# Patient Record
Sex: MALE | Race: WHITE | NOT HISPANIC OR LATINO | Employment: UNEMPLOYED | URBAN - METROPOLITAN AREA
[De-identification: names, ages, dates, MRNs, and addresses within clinical notes are randomized per-mention and may not be internally consistent; named-entity substitution may affect disease eponyms.]

---

## 2020-01-01 ENCOUNTER — TELEPHONE (OUTPATIENT)
Dept: CARDIOLOGY | Facility: CLINIC | Age: 50
End: 2020-01-01

## 2020-01-01 ENCOUNTER — HOSPITAL ENCOUNTER (OUTPATIENT)
Dept: RADIOLOGY | Facility: HOSPITAL | Age: 50
Discharge: HOME | End: 2020-12-01
Attending: PHYSICIAN ASSISTANT
Payer: MEDICARE

## 2020-01-01 ENCOUNTER — PATIENT MESSAGE (OUTPATIENT)
Dept: CARDIOLOGY | Facility: CLINIC | Age: 50
End: 2020-01-01

## 2020-01-01 ENCOUNTER — OFFICE VISIT (OUTPATIENT)
Dept: INTERNAL MEDICINE | Facility: CLINIC | Age: 50
End: 2020-01-01
Payer: MEDICARE

## 2020-01-01 ENCOUNTER — LAB VISIT (OUTPATIENT)
Dept: LAB | Facility: HOSPITAL | Age: 50
End: 2020-01-01
Attending: INTERNAL MEDICINE
Payer: MEDICARE

## 2020-01-01 ENCOUNTER — TELEPHONE (OUTPATIENT)
Dept: DIABETES | Facility: CLINIC | Age: 50
End: 2020-01-01

## 2020-01-01 ENCOUNTER — HOSPITAL ENCOUNTER (EMERGENCY)
Facility: HOSPITAL | Age: 50
Discharge: HOME | End: 2020-09-15
Attending: EMERGENCY MEDICINE
Payer: MEDICARE

## 2020-01-01 ENCOUNTER — TELEPHONE (OUTPATIENT)
Dept: INTERNAL MEDICINE | Facility: CLINIC | Age: 50
End: 2020-01-01

## 2020-01-01 ENCOUNTER — HOSPITAL ENCOUNTER (OUTPATIENT)
Facility: HOSPITAL | Age: 50
Discharge: HOME | End: 2020-12-17
Attending: EMERGENCY MEDICINE | Admitting: INTERNAL MEDICINE
Payer: MEDICARE

## 2020-01-01 ENCOUNTER — OFFICE VISIT (OUTPATIENT)
Dept: CARDIOLOGY | Facility: CLINIC | Age: 50
End: 2020-01-01
Payer: MEDICARE

## 2020-01-01 VITALS
HEART RATE: 91 BPM | OXYGEN SATURATION: 99 % | DIASTOLIC BLOOD PRESSURE: 90 MMHG | BODY MASS INDEX: 45.04 KG/M2 | WEIGHT: 314.63 LBS | HEIGHT: 70 IN | RESPIRATION RATE: 20 BRPM | SYSTOLIC BLOOD PRESSURE: 140 MMHG | TEMPERATURE: 98 F

## 2020-01-01 VITALS
HEART RATE: 110 BPM | WEIGHT: 302 LBS | SYSTOLIC BLOOD PRESSURE: 122 MMHG | TEMPERATURE: 101 F | HEIGHT: 70 IN | BODY MASS INDEX: 43.23 KG/M2 | DIASTOLIC BLOOD PRESSURE: 84 MMHG | OXYGEN SATURATION: 98 %

## 2020-01-01 VITALS
DIASTOLIC BLOOD PRESSURE: 80 MMHG | HEIGHT: 70 IN | OXYGEN SATURATION: 97 % | TEMPERATURE: 99 F | BODY MASS INDEX: 44.57 KG/M2 | HEART RATE: 82 BPM | WEIGHT: 311.31 LBS | SYSTOLIC BLOOD PRESSURE: 122 MMHG

## 2020-01-01 VITALS
DIASTOLIC BLOOD PRESSURE: 78 MMHG | WEIGHT: 311.31 LBS | OXYGEN SATURATION: 98 % | BODY MASS INDEX: 44.67 KG/M2 | HEART RATE: 110 BPM | SYSTOLIC BLOOD PRESSURE: 160 MMHG

## 2020-01-01 VITALS
TEMPERATURE: 97 F | HEIGHT: 70 IN | OXYGEN SATURATION: 98 % | SYSTOLIC BLOOD PRESSURE: 139 MMHG | WEIGHT: 311.94 LBS | BODY MASS INDEX: 44.66 KG/M2 | DIASTOLIC BLOOD PRESSURE: 80 MMHG | HEART RATE: 97 BPM | RESPIRATION RATE: 17 BRPM

## 2020-01-01 DIAGNOSIS — E11.69 HYPERLIPIDEMIA ASSOCIATED WITH TYPE 2 DIABETES MELLITUS: Chronic | ICD-10-CM

## 2020-01-01 DIAGNOSIS — E78.2 MIXED HYPERLIPIDEMIA: Primary | ICD-10-CM

## 2020-01-01 DIAGNOSIS — I10 ESSENTIAL HYPERTENSION: ICD-10-CM

## 2020-01-01 DIAGNOSIS — I25.2 HISTORY OF MI (MYOCARDIAL INFARCTION): ICD-10-CM

## 2020-01-01 DIAGNOSIS — R07.9 CHEST PAIN: Primary | ICD-10-CM

## 2020-01-01 DIAGNOSIS — Z79.4 TYPE 2 DIABETES MELLITUS WITH HYPERGLYCEMIA, WITH LONG-TERM CURRENT USE OF INSULIN: Primary | ICD-10-CM

## 2020-01-01 DIAGNOSIS — R07.9 CHEST PAIN ON EXERTION: ICD-10-CM

## 2020-01-01 DIAGNOSIS — Z09 HOSPITAL DISCHARGE FOLLOW-UP: Primary | ICD-10-CM

## 2020-01-01 DIAGNOSIS — E11.65 TYPE 2 DIABETES MELLITUS WITH HYPERGLYCEMIA, WITH LONG-TERM CURRENT USE OF INSULIN: ICD-10-CM

## 2020-01-01 DIAGNOSIS — Z79.4 TYPE 2 DIABETES MELLITUS WITH HYPERGLYCEMIA, WITH LONG-TERM CURRENT USE OF INSULIN: ICD-10-CM

## 2020-01-01 DIAGNOSIS — I25.2 HISTORY OF MI (MYOCARDIAL INFARCTION): Chronic | ICD-10-CM

## 2020-01-01 DIAGNOSIS — E66.01 MORBID OBESITY WITH BODY MASS INDEX OF 40.0-44.9 IN ADULT: Chronic | ICD-10-CM

## 2020-01-01 DIAGNOSIS — Z79.4 TYPE 2 DIABETES MELLITUS WITH OTHER SPECIFIED COMPLICATION, WITH LONG-TERM CURRENT USE OF INSULIN: ICD-10-CM

## 2020-01-01 DIAGNOSIS — E11.69 TYPE 2 DIABETES MELLITUS WITH OTHER SPECIFIED COMPLICATION, WITH LONG-TERM CURRENT USE OF INSULIN: ICD-10-CM

## 2020-01-01 DIAGNOSIS — I15.2 HYPERTENSION ASSOCIATED WITH DIABETES: Chronic | ICD-10-CM

## 2020-01-01 DIAGNOSIS — G47.33 OSA ON CPAP: ICD-10-CM

## 2020-01-01 DIAGNOSIS — Z23 NEED FOR PNEUMOCOCCAL VACCINATION: ICD-10-CM

## 2020-01-01 DIAGNOSIS — E11.65 TYPE 2 DIABETES MELLITUS WITH HYPERGLYCEMIA, WITH LONG-TERM CURRENT USE OF INSULIN: Primary | ICD-10-CM

## 2020-01-01 DIAGNOSIS — E11.59 HYPERTENSION ASSOCIATED WITH DIABETES: Chronic | ICD-10-CM

## 2020-01-01 DIAGNOSIS — R07.9 CHEST PAIN, UNSPECIFIED TYPE: Primary | ICD-10-CM

## 2020-01-01 DIAGNOSIS — E78.5 HYPERLIPIDEMIA ASSOCIATED WITH TYPE 2 DIABETES MELLITUS: Chronic | ICD-10-CM

## 2020-01-01 DIAGNOSIS — R07.9 CHEST PAIN, UNSPECIFIED TYPE: ICD-10-CM

## 2020-01-01 DIAGNOSIS — Z23 NEED FOR INFLUENZA VACCINATION: ICD-10-CM

## 2020-01-01 DIAGNOSIS — E78.2 MIXED HYPERLIPIDEMIA: ICD-10-CM

## 2020-01-01 DIAGNOSIS — R50.9 FEVER, UNSPECIFIED FEVER CAUSE: Primary | ICD-10-CM

## 2020-01-01 DIAGNOSIS — I25.110 CORONARY ARTERY DISEASE WITH UNSTABLE ANGINA PECTORIS, UNSPECIFIED VESSEL OR LESION TYPE, UNSPECIFIED WHETHER NATIVE OR TRANSPLANTED HEART: ICD-10-CM

## 2020-01-01 DIAGNOSIS — R07.9 CHEST PAIN: ICD-10-CM

## 2020-01-01 DIAGNOSIS — E66.01 MORBIDLY OBESE: ICD-10-CM

## 2020-01-01 DIAGNOSIS — R06.89 ABNORMAL BREATH SOUNDS: ICD-10-CM

## 2020-01-01 DIAGNOSIS — I25.118 CORONARY ARTERY DISEASE OF NATIVE ARTERY OF NATIVE HEART WITH STABLE ANGINA PECTORIS: ICD-10-CM

## 2020-01-01 LAB
ALBUMIN SERPL BCP-MCNC: 3.9 G/DL (ref 3.5–5.2)
ALBUMIN SERPL BCP-MCNC: 4 G/DL (ref 3.5–5.2)
ALP SERPL-CCNC: 73 U/L (ref 55–135)
ALP SERPL-CCNC: 74 U/L (ref 55–135)
ALT SERPL W/O P-5'-P-CCNC: 27 U/L (ref 10–44)
ALT SERPL W/O P-5'-P-CCNC: 37 U/L (ref 10–44)
ANION GAP SERPL CALC-SCNC: 11 MMOL/L (ref 8–16)
ANION GAP SERPL CALC-SCNC: 8 MMOL/L (ref 8–16)
ANION GAP SERPL CALC-SCNC: 9 MMOL/L (ref 8–16)
AORTIC ROOT ANNULUS: 3.9 CM
APTT BLDCRRT: 26.1 SEC (ref 21–32)
ASCENDING AORTA: 3.5 CM
AST SERPL-CCNC: 12 U/L (ref 10–40)
AST SERPL-CCNC: 19 U/L (ref 10–40)
AV INDEX (PROSTH): 0.87
AV MEAN GRADIENT: 4 MMHG
AV PEAK GRADIENT: 6 MMHG
AV VALVE AREA: 3.49 CM2
AV VELOCITY RATIO: 0.75
BACTERIA #/AREA URNS HPF: NORMAL /HPF
BASOPHILS # BLD AUTO: 0.01 K/UL (ref 0–0.2)
BASOPHILS # BLD AUTO: 0.03 K/UL (ref 0–0.2)
BASOPHILS # BLD AUTO: 0.04 K/UL (ref 0–0.2)
BASOPHILS NFR BLD: 0.2 % (ref 0–1.9)
BASOPHILS NFR BLD: 0.4 % (ref 0–1.9)
BASOPHILS NFR BLD: 0.5 % (ref 0–1.9)
BILIRUB SERPL-MCNC: 0.3 MG/DL (ref 0.1–1)
BILIRUB SERPL-MCNC: 0.5 MG/DL (ref 0.1–1)
BILIRUB UR QL STRIP: NEGATIVE
BNP SERPL-MCNC: <10 PG/ML (ref 0–99)
BNP SERPL-MCNC: <10 PG/ML (ref 0–99)
BSA FOR ECHO PROCEDURE: 2.64 M2
BUN SERPL-MCNC: 12 MG/DL (ref 6–20)
BUN SERPL-MCNC: 15 MG/DL (ref 6–20)
BUN SERPL-MCNC: 18 MG/DL (ref 6–20)
BUN SERPL-MCNC: 21 MG/DL (ref 6–20)
BUN SERPL-MCNC: 26 MG/DL (ref 6–20)
CALCIUM SERPL-MCNC: 9.4 MG/DL (ref 8.7–10.5)
CALCIUM SERPL-MCNC: 9.4 MG/DL (ref 8.7–10.5)
CALCIUM SERPL-MCNC: 9.5 MG/DL (ref 8.7–10.5)
CALCIUM SERPL-MCNC: 9.5 MG/DL (ref 8.7–10.5)
CALCIUM SERPL-MCNC: 9.8 MG/DL (ref 8.7–10.5)
CHLORIDE SERPL-SCNC: 101 MMOL/L (ref 95–110)
CHLORIDE SERPL-SCNC: 102 MMOL/L (ref 95–110)
CHLORIDE SERPL-SCNC: 99 MMOL/L (ref 95–110)
CK SERPL-CCNC: 42 U/L (ref 20–200)
CLARITY UR: CLEAR
CO2 SERPL-SCNC: 25 MMOL/L (ref 23–29)
CO2 SERPL-SCNC: 27 MMOL/L (ref 23–29)
CO2 SERPL-SCNC: 27 MMOL/L (ref 23–29)
CO2 SERPL-SCNC: 29 MMOL/L (ref 23–29)
CO2 SERPL-SCNC: 32 MMOL/L (ref 23–29)
COLOR UR: YELLOW
CREAT SERPL-MCNC: 1.2 MG/DL (ref 0.5–1.4)
CREAT SERPL-MCNC: 1.3 MG/DL (ref 0.5–1.4)
CV ECHO LV RWT: 0.91 CM
CV STRESS BASE HR: 93 BPM
DIASTOLIC BLOOD PRESSURE: 81 MMHG
DIFFERENTIAL METHOD: ABNORMAL
DOP CALC AO PEAK VEL: 1.22 M/S
DOP CALC AO VTI: 18.88 CM
DOP CALC LVOT AREA: 4 CM2
DOP CALC LVOT DIAMETER: 2.26 CM
DOP CALC LVOT PEAK VEL: 0.92 M/S
DOP CALC LVOT STROKE VOLUME: 65.92 CM3
DOP CALC RVOT PEAK VEL: 0.76 M/S
DOP CALC RVOT VTI: 14.03 CM
DOP CALCLVOT PEAK VEL VTI: 16.44 CM
E WAVE DECELERATION TIME: 196.61 MSEC
E/A RATIO: 1.04
E/E' RATIO: 10 M/S
ECHO LV POSTERIOR WALL: 1.71 CM (ref 0.6–1.1)
EJECTION FRACTION- HIGH: 65 %
END DIASTOLIC INDEX-HIGH: 158 ML/M2
END DIASTOLIC INDEX-LOW: 94 ML/M2
END SYSTOLIC INDEX-HIGH: 71 ML/M2
END SYSTOLIC INDEX-LOW: 33 ML/M2
EOSINOPHIL # BLD AUTO: 0.1 K/UL (ref 0–0.5)
EOSINOPHIL # BLD AUTO: 0.2 K/UL (ref 0–0.5)
EOSINOPHIL # BLD AUTO: 0.3 K/UL (ref 0–0.5)
EOSINOPHIL NFR BLD: 2 % (ref 0–8)
EOSINOPHIL NFR BLD: 2.6 % (ref 0–8)
EOSINOPHIL NFR BLD: 3.2 % (ref 0–8)
EOSINOPHIL NFR BLD: 3.9 % (ref 0–8)
EOSINOPHIL NFR BLD: 4.1 % (ref 0–8)
ERYTHROCYTE [DISTWIDTH] IN BLOOD BY AUTOMATED COUNT: 12.1 % (ref 11.5–14.5)
ERYTHROCYTE [DISTWIDTH] IN BLOOD BY AUTOMATED COUNT: 12.3 % (ref 11.5–14.5)
ERYTHROCYTE [DISTWIDTH] IN BLOOD BY AUTOMATED COUNT: 12.3 % (ref 11.5–14.5)
ERYTHROCYTE [DISTWIDTH] IN BLOOD BY AUTOMATED COUNT: 12.4 % (ref 11.5–14.5)
ERYTHROCYTE [DISTWIDTH] IN BLOOD BY AUTOMATED COUNT: 12.4 % (ref 11.5–14.5)
EST. GFR  (AFRICAN AMERICAN): >60 ML/MIN/1.73 M^2
EST. GFR  (NON AFRICAN AMERICAN): >60 ML/MIN/1.73 M^2
ESTIMATED AVG GLUCOSE: 249 MG/DL (ref 68–131)
FRACTIONAL SHORTENING: 36 % (ref 28–44)
GLUCOSE SERPL-MCNC: 172 MG/DL (ref 70–110)
GLUCOSE SERPL-MCNC: 174 MG/DL (ref 70–110)
GLUCOSE SERPL-MCNC: 215 MG/DL (ref 70–110)
GLUCOSE SERPL-MCNC: 237 MG/DL (ref 70–110)
GLUCOSE SERPL-MCNC: 368 MG/DL (ref 70–110)
GLUCOSE UR QL STRIP: ABNORMAL
HBA1C MFR BLD HPLC: 10.3 % (ref 4–5.6)
HCT VFR BLD AUTO: 41.6 % (ref 40–54)
HCT VFR BLD AUTO: 43.6 % (ref 40–54)
HCT VFR BLD AUTO: 44.1 % (ref 40–54)
HCT VFR BLD AUTO: 45 % (ref 40–54)
HCT VFR BLD AUTO: 45.6 % (ref 40–54)
HCV AB SERPL QL IA: NEGATIVE
HGB BLD-MCNC: 13.2 G/DL (ref 14–18)
HGB BLD-MCNC: 13.6 G/DL (ref 14–18)
HGB BLD-MCNC: 13.9 G/DL (ref 14–18)
HGB BLD-MCNC: 14.1 G/DL (ref 14–18)
HGB BLD-MCNC: 14.3 G/DL (ref 14–18)
HGB UR QL STRIP: NEGATIVE
HIV 1+2 AB+HIV1 P24 AG SERPL QL IA: NEGATIVE
IMM GRANULOCYTES # BLD AUTO: 0.01 K/UL (ref 0–0.04)
IMM GRANULOCYTES # BLD AUTO: 0.02 K/UL (ref 0–0.04)
IMM GRANULOCYTES # BLD AUTO: 0.02 K/UL (ref 0–0.04)
IMM GRANULOCYTES # BLD AUTO: 0.03 K/UL (ref 0–0.04)
IMM GRANULOCYTES # BLD AUTO: 0.03 K/UL (ref 0–0.04)
IMM GRANULOCYTES NFR BLD AUTO: 0.2 % (ref 0–0.5)
IMM GRANULOCYTES NFR BLD AUTO: 0.3 % (ref 0–0.5)
IMM GRANULOCYTES NFR BLD AUTO: 0.4 % (ref 0–0.5)
INR PPP: 1 (ref 0.8–1.2)
INTERVENTRICULAR SEPTUM: 1.75 CM (ref 0.6–1.1)
IVRT: 82.78 MSEC
KETONES UR QL STRIP: ABNORMAL
LA MAJOR: 5.15 CM
LA MINOR: 3.61 CM
LEFT ATRIUM SIZE: 3.71 CM
LEFT INTERNAL DIMENSION IN SYSTOLE: 2.42 CM (ref 2.1–4)
LEFT VENTRICLE DIASTOLIC VOLUME INDEX: 24.25 ML/M2
LEFT VENTRICLE DIASTOLIC VOLUME: 60.97 ML
LEFT VENTRICLE MASS INDEX: 108 G/M2
LEFT VENTRICLE SYSTOLIC VOLUME INDEX: 8.1 ML/M2
LEFT VENTRICLE SYSTOLIC VOLUME: 20.49 ML
LEFT VENTRICULAR INTERNAL DIMENSION IN DIASTOLE: 3.77 CM (ref 3.5–6)
LEFT VENTRICULAR MASS: 270.46 G
LEUKOCYTE ESTERASE UR QL STRIP: NEGATIVE
LV LATERAL E/E' RATIO: 8.75 M/S
LV SEPTAL E/E' RATIO: 11.67 M/S
LYMPHOCYTES # BLD AUTO: 1.3 K/UL (ref 1–4.8)
LYMPHOCYTES # BLD AUTO: 1.8 K/UL (ref 1–4.8)
LYMPHOCYTES # BLD AUTO: 1.8 K/UL (ref 1–4.8)
LYMPHOCYTES # BLD AUTO: 1.9 K/UL (ref 1–4.8)
LYMPHOCYTES # BLD AUTO: 2.5 K/UL (ref 1–4.8)
LYMPHOCYTES NFR BLD: 21.8 % (ref 18–48)
LYMPHOCYTES NFR BLD: 24.4 % (ref 18–48)
LYMPHOCYTES NFR BLD: 24.7 % (ref 18–48)
LYMPHOCYTES NFR BLD: 25.3 % (ref 18–48)
LYMPHOCYTES NFR BLD: 29.6 % (ref 18–48)
MAGNESIUM SERPL-MCNC: 1.7 MG/DL (ref 1.6–2.6)
MAGNESIUM SERPL-MCNC: 1.8 MG/DL (ref 1.6–2.6)
MAGNESIUM SERPL-MCNC: 1.9 MG/DL (ref 1.6–2.6)
MCH RBC QN AUTO: 28 PG (ref 27–31)
MCH RBC QN AUTO: 28.3 PG (ref 27–31)
MCH RBC QN AUTO: 28.3 PG (ref 27–31)
MCH RBC QN AUTO: 28.4 PG (ref 27–31)
MCH RBC QN AUTO: 29 PG (ref 27–31)
MCHC RBC AUTO-ENTMCNC: 30.3 G/DL (ref 32–36)
MCHC RBC AUTO-ENTMCNC: 31.3 G/DL (ref 32–36)
MCHC RBC AUTO-ENTMCNC: 31.4 G/DL (ref 32–36)
MCHC RBC AUTO-ENTMCNC: 31.5 G/DL (ref 32–36)
MCHC RBC AUTO-ENTMCNC: 32.7 G/DL (ref 32–36)
MCV RBC AUTO: 87 FL (ref 82–98)
MCV RBC AUTO: 90 FL (ref 82–98)
MCV RBC AUTO: 90 FL (ref 82–98)
MCV RBC AUTO: 92 FL (ref 82–98)
MCV RBC AUTO: 93 FL (ref 82–98)
MICROSCOPIC COMMENT: NORMAL
MONOCYTES # BLD AUTO: 0.6 K/UL (ref 0.3–1)
MONOCYTES # BLD AUTO: 0.6 K/UL (ref 0.3–1)
MONOCYTES # BLD AUTO: 0.7 K/UL (ref 0.3–1)
MONOCYTES # BLD AUTO: 0.7 K/UL (ref 0.3–1)
MONOCYTES # BLD AUTO: 1.1 K/UL (ref 0.3–1)
MONOCYTES NFR BLD: 10.1 % (ref 4–15)
MONOCYTES NFR BLD: 10.5 % (ref 4–15)
MONOCYTES NFR BLD: 10.7 % (ref 4–15)
MONOCYTES NFR BLD: 8.9 % (ref 4–15)
MONOCYTES NFR BLD: 9.4 % (ref 4–15)
MV PEAK A VEL: 0.67 M/S
MV PEAK E VEL: 0.7 M/S
NEUTROPHILS # BLD AUTO: 3.8 K/UL (ref 1.8–7.7)
NEUTROPHILS # BLD AUTO: 3.9 K/UL (ref 1.8–7.7)
NEUTROPHILS # BLD AUTO: 4.3 K/UL (ref 1.8–7.7)
NEUTROPHILS # BLD AUTO: 4.5 K/UL (ref 1.8–7.7)
NEUTROPHILS # BLD AUTO: 6.2 K/UL (ref 1.8–7.7)
NEUTROPHILS NFR BLD: 58.7 % (ref 38–73)
NEUTROPHILS NFR BLD: 59.9 % (ref 38–73)
NEUTROPHILS NFR BLD: 61.3 % (ref 38–73)
NEUTROPHILS NFR BLD: 61.4 % (ref 38–73)
NEUTROPHILS NFR BLD: 64.1 % (ref 38–73)
NITRITE UR QL STRIP: NEGATIVE
NRBC BLD-RTO: 0 /100 WBC
NUC REST DIASTOLIC VOLUME INDEX: 120
NUC REST EJECTION FRACTION: 46
NUC REST SYSTOLIC VOLUME INDEX: 64
NUC STRESS DIASTOLIC VOLUME INDEX: 104
NUC STRESS EJECTION FRACTION: 41 %
NUC STRESS SYSTOLIC VOLUME INDEX: 61
OHS CV CPX 85 PERCENT MAX PREDICTED HEART RATE MALE: 146
OHS CV CPX MAX PREDICTED HEART RATE: 172
OHS CV CPX PATIENT IS FEMALE: 0
OHS CV CPX PATIENT IS MALE: 1
OHS CV CPX PEAK DIASTOLIC BLOOD PRESSURE: 78 MMHG
OHS CV CPX PEAK HEAR RATE: 111 BPM
OHS CV CPX PEAK RATE PRESSURE PRODUCT: NORMAL
OHS CV CPX PEAK SYSTOLIC BLOOD PRESSURE: 118 MMHG
OHS CV CPX PERCENT MAX PREDICTED HEART RATE ACHIEVED: 65
OHS CV CPX RATE PRESSURE PRODUCT PRESENTING: NORMAL
PH UR STRIP: 6 [PH] (ref 5–8)
PHOSPHATE SERPL-MCNC: 3.2 MG/DL (ref 2.7–4.5)
PHOSPHATE SERPL-MCNC: 3.8 MG/DL (ref 2.7–4.5)
PHOSPHATE SERPL-MCNC: 4.2 MG/DL (ref 2.7–4.5)
PISA TR MAX VEL: 2.25 M/S
PLATELET # BLD AUTO: 232 K/UL (ref 150–350)
PLATELET # BLD AUTO: 239 K/UL (ref 150–350)
PLATELET # BLD AUTO: 253 K/UL (ref 150–350)
PLATELET # BLD AUTO: 255 K/UL (ref 150–350)
PLATELET # BLD AUTO: 294 K/UL (ref 150–350)
PMV BLD AUTO: 11.5 FL (ref 9.2–12.9)
PMV BLD AUTO: 11.7 FL (ref 9.2–12.9)
PMV BLD AUTO: 11.9 FL (ref 9.2–12.9)
PMV BLD AUTO: 11.9 FL (ref 9.2–12.9)
PMV BLD AUTO: 12 FL (ref 9.2–12.9)
POCT GLUCOSE: 155 MG/DL (ref 70–110)
POCT GLUCOSE: 178 MG/DL (ref 70–110)
POCT GLUCOSE: 180 MG/DL (ref 70–110)
POCT GLUCOSE: 192 MG/DL (ref 70–110)
POCT GLUCOSE: 202 MG/DL (ref 70–110)
POCT GLUCOSE: 203 MG/DL (ref 70–110)
POCT GLUCOSE: 229 MG/DL (ref 70–110)
POTASSIUM SERPL-SCNC: 3.6 MMOL/L (ref 3.5–5.1)
POTASSIUM SERPL-SCNC: 3.9 MMOL/L (ref 3.5–5.1)
POTASSIUM SERPL-SCNC: 4 MMOL/L (ref 3.5–5.1)
POTASSIUM SERPL-SCNC: 4.2 MMOL/L (ref 3.5–5.1)
POTASSIUM SERPL-SCNC: 4.4 MMOL/L (ref 3.5–5.1)
PROT SERPL-MCNC: 7.7 G/DL (ref 6–8.4)
PROT SERPL-MCNC: 7.7 G/DL (ref 6–8.4)
PROT UR QL STRIP: NEGATIVE
PROTHROMBIN TIME: 10.9 SEC (ref 9–12.5)
PV MEAN GRADIENT: 2 MMHG
RA MAJOR: 3.62 CM
RA PRESSURE: 3 MMHG
RBC # BLD AUTO: 4.71 M/UL (ref 4.6–6.2)
RBC # BLD AUTO: 4.79 M/UL (ref 4.6–6.2)
RBC # BLD AUTO: 4.8 M/UL (ref 4.6–6.2)
RBC # BLD AUTO: 4.99 M/UL (ref 4.6–6.2)
RBC # BLD AUTO: 5.06 M/UL (ref 4.6–6.2)
RETIRED EF AND QEF - SEE NOTES: 53 %
SARS-COV-2 RDRP RESP QL NAA+PROBE: NEGATIVE
SINUS: 3.72 CM
SODIUM SERPL-SCNC: 137 MMOL/L (ref 136–145)
SODIUM SERPL-SCNC: 137 MMOL/L (ref 136–145)
SODIUM SERPL-SCNC: 138 MMOL/L (ref 136–145)
SODIUM SERPL-SCNC: 139 MMOL/L (ref 136–145)
SODIUM SERPL-SCNC: 139 MMOL/L (ref 136–145)
SP GR UR STRIP: 1.02 (ref 1–1.03)
STJ: 3.54 CM
SYSTOLIC BLOOD PRESSURE: 115 MMHG
TDI LATERAL: 0.08 M/S
TDI SEPTAL: 0.06 M/S
TDI: 0.07 M/S
TR MAX PG: 20 MMHG
TRICUSPID ANNULAR PLANE SYSTOLIC EXCURSION: 1.99 CM
TROPONIN I SERPL DL<=0.01 NG/ML-MCNC: <0.006 NG/ML (ref 0–0.03)
TV REST PULMONARY ARTERY PRESSURE: 23 MMHG
URN SPEC COLLECT METH UR: ABNORMAL
UROBILINOGEN UR STRIP-ACNC: NEGATIVE EU/DL
WBC # BLD AUTO: 10.19 K/UL (ref 3.9–12.7)
WBC # BLD AUTO: 6 K/UL (ref 3.9–12.7)
WBC # BLD AUTO: 6.39 K/UL (ref 3.9–12.7)
WBC # BLD AUTO: 7.15 K/UL (ref 3.9–12.7)
WBC # BLD AUTO: 7.35 K/UL (ref 3.9–12.7)
YEAST URNS QL MICRO: NORMAL

## 2020-01-01 PROCEDURE — 86703 HIV-1/HIV-2 1 RESULT ANTBDY: CPT

## 2020-01-01 PROCEDURE — 99215 PR OFFICE/OUTPT VISIT, EST, LEVL V, 40-54 MIN: ICD-10-PCS | Mod: 25,S$GLB,, | Performed by: FAMILY MEDICINE

## 2020-01-01 PROCEDURE — G0009 ADMIN PNEUMOCOCCAL VACCINE: HCPCS | Mod: S$GLB,,, | Performed by: FAMILY MEDICINE

## 2020-01-01 PROCEDURE — 99999 PR PBB SHADOW E&M-EST. PATIENT-LVL III: ICD-10-PCS | Mod: PBBFAC,,, | Performed by: INTERNAL MEDICINE

## 2020-01-01 PROCEDURE — 93010 ELECTROCARDIOGRAM REPORT: CPT | Mod: ,,, | Performed by: INTERNAL MEDICINE

## 2020-01-01 PROCEDURE — 85025 COMPLETE CBC W/AUTO DIFF WBC: CPT

## 2020-01-01 PROCEDURE — 86803 HEPATITIS C AB TEST: CPT

## 2020-01-01 PROCEDURE — 90686 FLU VACCINE (QUAD) GREATER THAN OR EQUAL TO 3YO PRESERVATIVE FREE IM: ICD-10-PCS | Mod: S$GLB,,, | Performed by: FAMILY MEDICINE

## 2020-01-01 PROCEDURE — 84100 ASSAY OF PHOSPHORUS: CPT

## 2020-01-01 PROCEDURE — 93005 ELECTROCARDIOGRAM TRACING: CPT | Mod: 59

## 2020-01-01 PROCEDURE — 83735 ASSAY OF MAGNESIUM: CPT

## 2020-01-01 PROCEDURE — 83036 HEMOGLOBIN GLYCOSYLATED A1C: CPT

## 2020-01-01 PROCEDURE — 3008F PR BODY MASS INDEX (BMI) DOCUMENTED: ICD-10-PCS | Mod: CPTII,S$GLB,, | Performed by: FAMILY MEDICINE

## 2020-01-01 PROCEDURE — 84484 ASSAY OF TROPONIN QUANT: CPT

## 2020-01-01 PROCEDURE — 3046F HEMOGLOBIN A1C LEVEL >9.0%: CPT | Mod: CPTII,S$GLB,, | Performed by: PHYSICIAN ASSISTANT

## 2020-01-01 PROCEDURE — 81000 URINALYSIS NONAUTO W/SCOPE: CPT

## 2020-01-01 PROCEDURE — 63600175 PHARM REV CODE 636 W HCPCS: Performed by: NURSE PRACTITIONER

## 2020-01-01 PROCEDURE — G0008 ADMIN INFLUENZA VIRUS VAC: HCPCS | Mod: S$GLB,,, | Performed by: FAMILY MEDICINE

## 2020-01-01 PROCEDURE — 96372 THER/PROPH/DIAG INJ SC/IM: CPT | Mod: 59

## 2020-01-01 PROCEDURE — 93005 ELECTROCARDIOGRAM TRACING: CPT

## 2020-01-01 PROCEDURE — 83880 ASSAY OF NATRIURETIC PEPTIDE: CPT

## 2020-01-01 PROCEDURE — 99204 PR OFFICE/OUTPT VISIT, NEW, LEVL IV, 45-59 MIN: ICD-10-PCS | Mod: S$GLB,,, | Performed by: PHYSICIAN ASSISTANT

## 2020-01-01 PROCEDURE — 25000003 PHARM REV CODE 250: Performed by: NURSE PRACTITIONER

## 2020-01-01 PROCEDURE — 3008F BODY MASS INDEX DOCD: CPT | Mod: CPTII,S$GLB,, | Performed by: INTERNAL MEDICINE

## 2020-01-01 PROCEDURE — 93010 EKG 12-LEAD: ICD-10-PCS | Mod: ,,, | Performed by: INTERNAL MEDICINE

## 2020-01-01 PROCEDURE — 99214 PR OFFICE/OUTPT VISIT, EST, LEVL IV, 30-39 MIN: ICD-10-PCS | Mod: S$GLB,,, | Performed by: INTERNAL MEDICINE

## 2020-01-01 PROCEDURE — 90686 IIV4 VACC NO PRSV 0.5 ML IM: CPT | Mod: S$GLB,,, | Performed by: FAMILY MEDICINE

## 2020-01-01 PROCEDURE — G0008 FLU VACCINE (QUAD) GREATER THAN OR EQUAL TO 3YO PRESERVATIVE FREE IM: ICD-10-PCS | Mod: S$GLB,,, | Performed by: FAMILY MEDICINE

## 2020-01-01 PROCEDURE — 82962 GLUCOSE BLOOD TEST: CPT

## 2020-01-01 PROCEDURE — G0378 HOSPITAL OBSERVATION PER HR: HCPCS

## 2020-01-01 PROCEDURE — 3046F PR MOST RECENT HEMOGLOBIN A1C LEVEL > 9.0%: ICD-10-PCS | Mod: CPTII,S$GLB,, | Performed by: PHYSICIAN ASSISTANT

## 2020-01-01 PROCEDURE — 36415 COLL VENOUS BLD VENIPUNCTURE: CPT

## 2020-01-01 PROCEDURE — 90732 PNEUMOCOCCAL POLYSACCHARIDE VACCINE 23-VALENT =>2YO SQ IM: ICD-10-PCS | Mod: S$GLB,,, | Performed by: FAMILY MEDICINE

## 2020-01-01 PROCEDURE — 96376 TX/PRO/DX INJ SAME DRUG ADON: CPT

## 2020-01-01 PROCEDURE — 99285 EMERGENCY DEPT VISIT HI MDM: CPT | Mod: 25

## 2020-01-01 PROCEDURE — 99204 PR OFFICE/OUTPT VISIT, NEW, LEVL IV, 45-59 MIN: ICD-10-PCS | Mod: 25,,, | Performed by: INTERNAL MEDICINE

## 2020-01-01 PROCEDURE — 99214 PR OFFICE/OUTPT VISIT, EST, LEVL IV, 30-39 MIN: ICD-10-PCS | Mod: 25,,, | Performed by: INTERNAL MEDICINE

## 2020-01-01 PROCEDURE — 1126F AMNT PAIN NOTED NONE PRSNT: CPT | Mod: S$GLB,,, | Performed by: FAMILY MEDICINE

## 2020-01-01 PROCEDURE — 99900035 HC TECH TIME PER 15 MIN (STAT)

## 2020-01-01 PROCEDURE — 1126F PR PAIN SEVERITY QUANTIFIED, NO PAIN PRESENT: ICD-10-PCS | Mod: S$GLB,,, | Performed by: FAMILY MEDICINE

## 2020-01-01 PROCEDURE — 3046F PR MOST RECENT HEMOGLOBIN A1C LEVEL > 9.0%: ICD-10-PCS | Mod: CPTII,S$GLB,, | Performed by: FAMILY MEDICINE

## 2020-01-01 PROCEDURE — 99214 OFFICE O/P EST MOD 30 MIN: CPT | Mod: S$GLB,,, | Performed by: INTERNAL MEDICINE

## 2020-01-01 PROCEDURE — 3008F PR BODY MASS INDEX (BMI) DOCUMENTED: ICD-10-PCS | Mod: CPTII,S$GLB,, | Performed by: PHYSICIAN ASSISTANT

## 2020-01-01 PROCEDURE — C9399 UNCLASSIFIED DRUGS OR BIOLOG: HCPCS | Performed by: NURSE PRACTITIONER

## 2020-01-01 PROCEDURE — 3008F PR BODY MASS INDEX (BMI) DOCUMENTED: ICD-10-PCS | Mod: CPTII,S$GLB,, | Performed by: INTERNAL MEDICINE

## 2020-01-01 PROCEDURE — 99213 PR OFFICE/OUTPT VISIT, EST, LEVL III, 20-29 MIN: ICD-10-PCS | Mod: ,,, | Performed by: INTERNAL MEDICINE

## 2020-01-01 PROCEDURE — 99999 PR PBB SHADOW E&M-EST. PATIENT-LVL IV: ICD-10-PCS | Mod: PBBFAC,,, | Performed by: PHYSICIAN ASSISTANT

## 2020-01-01 PROCEDURE — 3046F HEMOGLOBIN A1C LEVEL >9.0%: CPT | Mod: CPTII,S$GLB,, | Performed by: FAMILY MEDICINE

## 2020-01-01 PROCEDURE — 71046 XR CHEST PA AND LATERAL: ICD-10-PCS | Mod: 26,,, | Performed by: RADIOLOGY

## 2020-01-01 PROCEDURE — 94660 CPAP INITIATION&MGMT: CPT

## 2020-01-01 PROCEDURE — 99999 PR PBB SHADOW E&M-EST. PATIENT-LVL III: CPT | Mod: PBBFAC,,, | Performed by: FAMILY MEDICINE

## 2020-01-01 PROCEDURE — 90732 PPSV23 VACC 2 YRS+ SUBQ/IM: CPT | Mod: S$GLB,,, | Performed by: FAMILY MEDICINE

## 2020-01-01 PROCEDURE — 3046F HEMOGLOBIN A1C LEVEL >9.0%: CPT | Mod: CPTII,S$GLB,, | Performed by: INTERNAL MEDICINE

## 2020-01-01 PROCEDURE — 99999 PR PBB SHADOW E&M-EST. PATIENT-LVL III: CPT | Mod: PBBFAC,,, | Performed by: INTERNAL MEDICINE

## 2020-01-01 PROCEDURE — 99204 OFFICE O/P NEW MOD 45 MIN: CPT | Mod: S$GLB,,, | Performed by: PHYSICIAN ASSISTANT

## 2020-01-01 PROCEDURE — 96374 THER/PROPH/DIAG INJ IV PUSH: CPT

## 2020-01-01 PROCEDURE — 80048 BASIC METABOLIC PNL TOTAL CA: CPT

## 2020-01-01 PROCEDURE — 71046 X-RAY EXAM CHEST 2 VIEWS: CPT | Mod: 26,,, | Performed by: RADIOLOGY

## 2020-01-01 PROCEDURE — 3008F BODY MASS INDEX DOCD: CPT | Mod: CPTII,S$GLB,, | Performed by: PHYSICIAN ASSISTANT

## 2020-01-01 PROCEDURE — 71046 X-RAY EXAM CHEST 2 VIEWS: CPT | Mod: TC

## 2020-01-01 PROCEDURE — 85730 THROMBOPLASTIN TIME PARTIAL: CPT

## 2020-01-01 PROCEDURE — 1125F AMNT PAIN NOTED PAIN PRSNT: CPT | Mod: S$GLB,,, | Performed by: PHYSICIAN ASSISTANT

## 2020-01-01 PROCEDURE — 96376 TX/PRO/DX INJ SAME DRUG ADON: CPT | Mod: 59

## 2020-01-01 PROCEDURE — U0002 COVID-19 LAB TEST NON-CDC: HCPCS

## 2020-01-01 PROCEDURE — 3046F PR MOST RECENT HEMOGLOBIN A1C LEVEL > 9.0%: ICD-10-PCS | Mod: CPTII,S$GLB,, | Performed by: INTERNAL MEDICINE

## 2020-01-01 PROCEDURE — 99213 OFFICE O/P EST LOW 20 MIN: CPT | Mod: ,,, | Performed by: INTERNAL MEDICINE

## 2020-01-01 PROCEDURE — 99215 OFFICE O/P EST HI 40 MIN: CPT | Mod: 25,S$GLB,, | Performed by: FAMILY MEDICINE

## 2020-01-01 PROCEDURE — 25000003 PHARM REV CODE 250: Performed by: EMERGENCY MEDICINE

## 2020-01-01 PROCEDURE — 82550 ASSAY OF CK (CPK): CPT

## 2020-01-01 PROCEDURE — 99214 OFFICE O/P EST MOD 30 MIN: CPT | Mod: 25,,, | Performed by: INTERNAL MEDICINE

## 2020-01-01 PROCEDURE — 80053 COMPREHEN METABOLIC PANEL: CPT

## 2020-01-01 PROCEDURE — 3008F BODY MASS INDEX DOCD: CPT | Mod: CPTII,S$GLB,, | Performed by: FAMILY MEDICINE

## 2020-01-01 PROCEDURE — 27000190 HC CPAP FULL FACE MASK W/VALVE

## 2020-01-01 PROCEDURE — G0009 PNEUMOCOCCAL POLYSACCHARIDE VACCINE 23-VALENT =>2YO SQ IM: ICD-10-PCS | Mod: S$GLB,,, | Performed by: FAMILY MEDICINE

## 2020-01-01 PROCEDURE — 63600175 PHARM REV CODE 636 W HCPCS: Performed by: EMERGENCY MEDICINE

## 2020-01-01 PROCEDURE — 99999 PR PBB SHADOW E&M-EST. PATIENT-LVL IV: CPT | Mod: PBBFAC,,, | Performed by: PHYSICIAN ASSISTANT

## 2020-01-01 PROCEDURE — 99204 OFFICE O/P NEW MOD 45 MIN: CPT | Mod: 25,,, | Performed by: INTERNAL MEDICINE

## 2020-01-01 PROCEDURE — 84484 ASSAY OF TROPONIN QUANT: CPT | Mod: 91

## 2020-01-01 PROCEDURE — 99999 PR PBB SHADOW E&M-EST. PATIENT-LVL III: ICD-10-PCS | Mod: PBBFAC,,, | Performed by: FAMILY MEDICINE

## 2020-01-01 PROCEDURE — 85610 PROTHROMBIN TIME: CPT

## 2020-01-01 PROCEDURE — 1125F PR PAIN SEVERITY QUANTIFIED, PAIN PRESENT: ICD-10-PCS | Mod: S$GLB,,, | Performed by: PHYSICIAN ASSISTANT

## 2020-01-01 RX ORDER — ACETAMINOPHEN 325 MG/1
650 TABLET ORAL EVERY 4 HOURS PRN
Status: DISCONTINUED | OUTPATIENT
Start: 2020-01-01 | End: 2020-01-01 | Stop reason: HOSPADM

## 2020-01-01 RX ORDER — IBUPROFEN 200 MG
16 TABLET ORAL
Status: DISCONTINUED | OUTPATIENT
Start: 2020-01-01 | End: 2020-01-01 | Stop reason: HOSPADM

## 2020-01-01 RX ORDER — GLUCAGON 1 MG
1 KIT INJECTION
Status: DISCONTINUED | OUTPATIENT
Start: 2020-01-01 | End: 2020-01-01 | Stop reason: HOSPADM

## 2020-01-01 RX ORDER — ISOSORBIDE MONONITRATE 30 MG/1
30 TABLET, EXTENDED RELEASE ORAL DAILY
Status: DISCONTINUED | OUTPATIENT
Start: 2020-01-01 | End: 2020-01-01 | Stop reason: HOSPADM

## 2020-01-01 RX ORDER — CLOPIDOGREL BISULFATE 75 MG/1
75 TABLET ORAL DAILY
Qty: 30 TABLET | Refills: 0 | Status: SHIPPED | OUTPATIENT
Start: 2020-01-01 | End: 2020-01-01 | Stop reason: SDUPTHER

## 2020-01-01 RX ORDER — IBUPROFEN 200 MG
24 TABLET ORAL
Status: DISCONTINUED | OUTPATIENT
Start: 2020-01-01 | End: 2020-01-01 | Stop reason: HOSPADM

## 2020-01-01 RX ORDER — REGADENOSON 0.08 MG/ML
0.4 INJECTION, SOLUTION INTRAVENOUS ONCE
Status: COMPLETED | OUTPATIENT
Start: 2020-01-01 | End: 2020-01-01

## 2020-01-01 RX ORDER — ROSUVASTATIN CALCIUM 10 MG/1
40 TABLET, COATED ORAL NIGHTLY
Status: DISCONTINUED | OUTPATIENT
Start: 2020-01-01 | End: 2020-01-01 | Stop reason: HOSPADM

## 2020-01-01 RX ORDER — LISINOPRIL 20 MG/1
20 TABLET ORAL DAILY
Qty: 30 TABLET | Refills: 11 | Status: SHIPPED | OUTPATIENT
Start: 2020-01-01

## 2020-01-01 RX ORDER — INSULIN GLARGINE 100 [IU]/ML
20 INJECTION, SOLUTION SUBCUTANEOUS NIGHTLY
Qty: 10 ML | Refills: 1 | Status: ON HOLD | OUTPATIENT
Start: 2020-01-01 | End: 2020-01-01 | Stop reason: SDUPTHER

## 2020-01-01 RX ORDER — SITAGLIPTIN 100 MG/1
100 TABLET, FILM COATED ORAL DAILY
Qty: 30 TABLET | Refills: 0 | Status: SHIPPED | OUTPATIENT
Start: 2020-01-01

## 2020-01-01 RX ORDER — FUROSEMIDE 10 MG/ML
40 INJECTION INTRAMUSCULAR; INTRAVENOUS DAILY
Status: DISCONTINUED | OUTPATIENT
Start: 2020-01-01 | End: 2020-01-01 | Stop reason: HOSPADM

## 2020-01-01 RX ORDER — FAMOTIDINE 20 MG/1
20 TABLET, FILM COATED ORAL 2 TIMES DAILY
Status: DISCONTINUED | OUTPATIENT
Start: 2020-01-01 | End: 2020-01-01

## 2020-01-01 RX ORDER — INSULIN ASPART 100 [IU]/ML
0-5 INJECTION, SOLUTION INTRAVENOUS; SUBCUTANEOUS
Status: DISCONTINUED | OUTPATIENT
Start: 2020-01-01 | End: 2020-01-01 | Stop reason: HOSPADM

## 2020-01-01 RX ORDER — LANCETS
EACH MISCELLANEOUS
Qty: 200 EACH | Refills: 11 | Status: SHIPPED | OUTPATIENT
Start: 2020-01-01

## 2020-01-01 RX ORDER — ISOSORBIDE MONONITRATE 60 MG/1
60 TABLET, EXTENDED RELEASE ORAL DAILY
Qty: 30 TABLET | Refills: 11 | Status: SHIPPED | OUTPATIENT
Start: 2020-01-01 | End: 2021-12-23

## 2020-01-01 RX ORDER — CLOPIDOGREL BISULFATE 75 MG/1
75 TABLET ORAL DAILY
Qty: 90 TABLET | Refills: 3 | Status: SHIPPED | OUTPATIENT
Start: 2020-01-01

## 2020-01-01 RX ORDER — INSULIN GLARGINE 100 [IU]/ML
20 INJECTION, SOLUTION SUBCUTANEOUS NIGHTLY
Qty: 10 ML | Refills: 1 | Status: SHIPPED | OUTPATIENT
Start: 2020-01-01

## 2020-01-01 RX ORDER — METFORMIN HYDROCHLORIDE 1000 MG/1
1000 TABLET ORAL 2 TIMES DAILY
Status: ON HOLD | COMMUNITY
Start: 2020-01-01 | End: 2020-01-01 | Stop reason: SDUPTHER

## 2020-01-01 RX ORDER — ISOSORBIDE MONONITRATE 30 MG/1
30 TABLET, EXTENDED RELEASE ORAL DAILY
Qty: 30 TABLET | Refills: 0 | Status: SHIPPED | OUTPATIENT
Start: 2020-01-01 | End: 2020-01-01

## 2020-01-01 RX ORDER — LISINOPRIL 20 MG/1
20 TABLET ORAL DAILY
Status: ON HOLD | COMMUNITY
Start: 2020-01-01 | End: 2020-01-01 | Stop reason: SDUPTHER

## 2020-01-01 RX ORDER — LISINOPRIL 20 MG/1
20 TABLET ORAL DAILY
Status: DISCONTINUED | OUTPATIENT
Start: 2020-01-01 | End: 2020-01-01 | Stop reason: HOSPADM

## 2020-01-01 RX ORDER — METOPROLOL TARTRATE 25 MG/1
25 TABLET, FILM COATED ORAL 2 TIMES DAILY
Qty: 60 TABLET | Refills: 11 | Status: ON HOLD | OUTPATIENT
Start: 2020-01-01 | End: 2021-01-01 | Stop reason: SDUPTHER

## 2020-01-01 RX ORDER — ASPIRIN 81 MG/1
81 TABLET ORAL DAILY
Qty: 30 TABLET | Refills: 0 | Status: SHIPPED | OUTPATIENT
Start: 2020-01-01 | End: 2021-12-18

## 2020-01-01 RX ORDER — PANTOPRAZOLE SODIUM 40 MG/1
40 TABLET, DELAYED RELEASE ORAL DAILY
Status: DISCONTINUED | OUTPATIENT
Start: 2020-01-01 | End: 2020-01-01 | Stop reason: HOSPADM

## 2020-01-01 RX ORDER — ROSUVASTATIN CALCIUM 40 MG/1
40 TABLET, COATED ORAL NIGHTLY
Qty: 90 TABLET | Refills: 4 | Status: SHIPPED | OUTPATIENT
Start: 2020-01-01 | End: 2021-03-02

## 2020-01-01 RX ORDER — LISINOPRIL 20 MG/1
20 TABLET ORAL DAILY
Qty: 30 TABLET | Refills: 0 | Status: SHIPPED | OUTPATIENT
Start: 2020-01-01 | End: 2020-01-01 | Stop reason: SDUPTHER

## 2020-01-01 RX ORDER — CLOPIDOGREL BISULFATE 75 MG/1
75 TABLET ORAL DAILY
Status: ON HOLD | COMMUNITY
Start: 2020-01-01 | End: 2020-01-01 | Stop reason: SDUPTHER

## 2020-01-01 RX ORDER — CLOPIDOGREL BISULFATE 75 MG/1
75 TABLET ORAL DAILY
Status: DISCONTINUED | OUTPATIENT
Start: 2020-01-01 | End: 2020-01-01 | Stop reason: HOSPADM

## 2020-01-01 RX ORDER — NITROGLYCERIN 0.4 MG/1
0.4 TABLET SUBLINGUAL EVERY 5 MIN PRN
Qty: 100 TABLET | Refills: 3 | Status: SHIPPED | OUTPATIENT
Start: 2020-01-01 | End: 2021-12-23

## 2020-01-01 RX ORDER — METOPROLOL TARTRATE 25 MG/1
25 TABLET, FILM COATED ORAL 2 TIMES DAILY
Qty: 60 TABLET | Refills: 0 | Status: SHIPPED | OUTPATIENT
Start: 2020-01-01 | End: 2020-01-01 | Stop reason: SDUPTHER

## 2020-01-01 RX ORDER — ASPIRIN 81 MG/1
81 TABLET ORAL DAILY
Status: DISCONTINUED | OUTPATIENT
Start: 2020-01-01 | End: 2020-01-01 | Stop reason: HOSPADM

## 2020-01-01 RX ORDER — ASPIRIN 325 MG
325 TABLET ORAL
Status: COMPLETED | OUTPATIENT
Start: 2020-01-01 | End: 2020-01-01

## 2020-01-01 RX ORDER — SODIUM CHLORIDE 0.9 % (FLUSH) 0.9 %
10 SYRINGE (ML) INJECTION
Status: DISCONTINUED | OUTPATIENT
Start: 2020-01-01 | End: 2020-01-01 | Stop reason: HOSPADM

## 2020-01-01 RX ORDER — METFORMIN HYDROCHLORIDE 1000 MG/1
1000 TABLET ORAL 2 TIMES DAILY
Qty: 60 TABLET | Refills: 0 | Status: SHIPPED | OUTPATIENT
Start: 2020-01-01

## 2020-01-01 RX ORDER — SITAGLIPTIN 100 MG/1
100 TABLET, FILM COATED ORAL DAILY
Status: ON HOLD | COMMUNITY
Start: 2020-01-01 | End: 2020-01-01 | Stop reason: SDUPTHER

## 2020-01-01 RX ORDER — DEXTROSE 4 G
TABLET,CHEWABLE ORAL
Qty: 1 EACH | Refills: 0 | Status: SHIPPED | OUTPATIENT
Start: 2020-01-01

## 2020-01-01 RX ORDER — METOPROLOL TARTRATE 25 MG/1
25 TABLET, FILM COATED ORAL 2 TIMES DAILY
Status: DISCONTINUED | OUTPATIENT
Start: 2020-01-01 | End: 2020-01-01 | Stop reason: HOSPADM

## 2020-01-01 RX ADMIN — PANTOPRAZOLE SODIUM 40 MG: 40 TABLET, DELAYED RELEASE ORAL at 09:12

## 2020-01-01 RX ADMIN — ISOSORBIDE MONONITRATE 30 MG: 30 TABLET, EXTENDED RELEASE ORAL at 08:12

## 2020-01-01 RX ADMIN — INSULIN HUMAN 10 UNITS: 100 INJECTION, SOLUTION PARENTERAL at 12:12

## 2020-01-01 RX ADMIN — LISINOPRIL 20 MG: 20 TABLET ORAL at 09:12

## 2020-01-01 RX ADMIN — ASPIRIN 81 MG: 81 TABLET, COATED ORAL at 08:12

## 2020-01-01 RX ADMIN — ASPIRIN 81 MG: 81 TABLET, COATED ORAL at 04:12

## 2020-01-01 RX ADMIN — INSULIN ASPART 1 UNITS: 100 INJECTION, SOLUTION INTRAVENOUS; SUBCUTANEOUS at 10:12

## 2020-01-01 RX ADMIN — LISINOPRIL 20 MG: 20 TABLET ORAL at 08:12

## 2020-01-01 RX ADMIN — ROSUVASTATIN 40 MG: 10 TABLET, FILM COATED ORAL at 09:12

## 2020-01-01 RX ADMIN — ASPIRIN 81 MG: 81 TABLET, COATED ORAL at 09:12

## 2020-01-01 RX ADMIN — FUROSEMIDE 40 MG: 10 INJECTION, SOLUTION INTRAMUSCULAR; INTRAVENOUS at 08:12

## 2020-01-01 RX ADMIN — METOPROLOL TARTRATE 25 MG: 25 TABLET, FILM COATED ORAL at 09:12

## 2020-01-01 RX ADMIN — CLOPIDOGREL 75 MG: 75 TABLET, FILM COATED ORAL at 09:12

## 2020-01-01 RX ADMIN — FUROSEMIDE 40 MG: 10 INJECTION, SOLUTION INTRAMUSCULAR; INTRAVENOUS at 04:12

## 2020-01-01 RX ADMIN — ISOSORBIDE MONONITRATE 30 MG: 30 TABLET, EXTENDED RELEASE ORAL at 09:12

## 2020-01-01 RX ADMIN — INSULIN DETEMIR 20 UNITS: 100 INJECTION, SOLUTION SUBCUTANEOUS at 09:12

## 2020-01-01 RX ADMIN — METOPROLOL TARTRATE 25 MG: 25 TABLET, FILM COATED ORAL at 08:12

## 2020-01-01 RX ADMIN — METOPROLOL TARTRATE 25 MG: 25 TABLET, FILM COATED ORAL at 04:12

## 2020-01-01 RX ADMIN — INSULIN DETEMIR 20 UNITS: 100 INJECTION, SOLUTION SUBCUTANEOUS at 10:12

## 2020-01-01 RX ADMIN — CLOPIDOGREL 75 MG: 75 TABLET, FILM COATED ORAL at 08:12

## 2020-01-01 RX ADMIN — ISOSORBIDE MONONITRATE 30 MG: 30 TABLET, EXTENDED RELEASE ORAL at 04:12

## 2020-01-01 RX ADMIN — INSULIN ASPART 2 UNITS: 100 INJECTION, SOLUTION INTRAVENOUS; SUBCUTANEOUS at 05:12

## 2020-01-01 RX ADMIN — PANTOPRAZOLE SODIUM 40 MG: 40 TABLET, DELAYED RELEASE ORAL at 08:12

## 2020-01-01 RX ADMIN — FUROSEMIDE 40 MG: 10 INJECTION, SOLUTION INTRAMUSCULAR; INTRAVENOUS at 10:12

## 2020-01-01 RX ADMIN — ASPIRIN 325 MG: 325 TABLET ORAL at 09:09

## 2020-01-01 RX ADMIN — NITROGLYCERIN 1 INCH: 20 OINTMENT TOPICAL at 11:12

## 2020-01-01 RX ADMIN — REGADENOSON 0.4 MG: 0.08 INJECTION, SOLUTION INTRAVENOUS at 10:12

## 2020-09-16 NOTE — ED PROVIDER NOTES
50 year old presents to the ER with dizziness, near syncope and chest pain. Patient is from [place] and has not had is medication in 8 months.      Pt understands that a workup will begin in the treatment lounge/results waiting areas due to there being no available beds. Pt also undertstands they will be placed in the next available bed where they will be seen and dispositioned by a physician. I am removing myself from the care of pt. Pt will be assigned to next available physician.      Aaron Phillips FNP-C 1919     SCRIBE #1 NOTE: I, Margie Walsh, am scribing for, and in the presence of, Hugo Jernigan MD. I have scribed the entire note.       History     Chief Complaint   Patient presents with    Dizziness     pt reports needs diabletes and heart medication.      Review of patient's allergies indicates:   Allergen Reactions    Beesix          History of Present Illness     HPI    9/15/2020, 9:39 PM  History obtained from the patient      History of Present Illness: Kushal Stevens is a 50 y.o. male patient with a PMHx of DM who presents to the Emergency Department for evaluation of dizziness which onset gradually 7 hours PTA. Pt reports he was feeling dizzy and felt like he was going to fall so he sat down. Pt states he has been out of his diabetes medication for about 8 months since he has moved from [place] to [place] and is in need of a refill. Symptoms are episodic and moderate in severity. No mitigating or exacerbating factors reported. Associated sxs include palpitations. Patient denies any fever, n/v/d, LOC, weakness, numbness, CP, SOB, and all other sxs at this time. No prior Tx reported. No further complaints or concerns at this time.       Arrival mode: Personal vehicle      PCP: Primary Doctor No        Past Medical History:  History reviewed. No pertinent medical history.       Past Surgical History:  History reviewed. No pertinent surgical history.        Family History:  History reviewed. No pertinent family  history.       Social History:  Social History     Tobacco Use    Smoking status: Unknown   Substance and Sexual Activity    Alcohol use: Unknown    Drug use: Unknown    Sexual activity: Unknown        Review of Systems     Review of Systems   Constitutional: Negative for fever.   HENT: Negative for sore throat.    Respiratory: Negative for shortness of breath.    Cardiovascular: Positive for palpitations. Negative for chest pain.   Gastrointestinal: Negative for diarrhea, nausea and vomiting.   Genitourinary: Negative for dysuria.   Musculoskeletal: Negative for back pain.   Skin: Negative for rash.   Neurological: Positive for dizziness. Negative for weakness and numbness.   Hematological: Does not bruise/bleed easily.   All other systems reviewed and are negative.       Physical Exam     Initial Vitals [09/15/20 1915]   BP Pulse Resp Temp SpO2   (!) 166/101 96 20 98.3 °F (36.8 °C) 96 %      MAP       --          Physical Exam  Nursing Notes and Vital Signs Reviewed.  Constitutional: Patient is in no acute distress. Well-developed and well-nourished.  Head: Atraumatic. Normocephalic.  Eyes: PERRL. EOM intact. Conjunctivae are not pale. No scleral icterus.  ENT: Mucous membranes are moist. Oropharynx is clear and symmetric.    Neck: Supple. Full ROM. No lymphadenopathy.  Cardiovascular: Regular rate. Regular rhythm. No murmurs, rubs, or gallops. Distal pulses are 2+ and symmetric.  Pulmonary/Chest: No respiratory distress. Clear to auscultation bilaterally. No wheezing or rales.  Abdominal: Soft and non-distended.  There is no tenderness.  No rebound, guarding, or rigidity. Good bowel sounds.  Genitourinary: No CVA tenderness  Musculoskeletal: Moves all extremities. No obvious deformities. No edema. No calf tenderness.  Skin: Warm and dry.  Neurological:  Alert, awake, and appropriate.  Normal speech.  No acute focal neurological deficits are appreciated.  Psychiatric: Normal affect. Good eye contact.  "Appropriate in content.     ED Course   Procedures  ED Vital Signs:  Vitals:    09/15/20 1915 09/15/20 2100 09/15/20 2131   BP: (!) 166/101 (!) 140/90    Pulse: 96 82 91   Resp: 20 20    Temp: 98.3 °F (36.8 °C)     TempSrc: Oral     SpO2: 96% 99%    Weight: (!) 142.7 kg (314 lb 9.5 oz)     Height: 5' 10" (1.778 m)         Abnormal Lab Results:  Labs Reviewed   CBC W/ AUTO DIFFERENTIAL - Abnormal; Notable for the following components:       Result Value    Hemoglobin 13.6 (*)     All other components within normal limits   COMPREHENSIVE METABOLIC PANEL - Abnormal; Notable for the following components:    Glucose 237 (*)     All other components within normal limits   POCT GLUCOSE - Abnormal; Notable for the following components:    POCT Glucose 203 (*)     All other components within normal limits   TROPONIN I   B-TYPE NATRIURETIC PEPTIDE        All Lab Results:  Results for orders placed or performed during the hospital encounter of 09/15/20   CBC auto differential   Result Value Ref Range    WBC 6.39 3.90 - 12.70 K/uL    RBC 4.79 4.60 - 6.20 M/uL    Hemoglobin 13.6 (L) 14.0 - 18.0 g/dL    Hematocrit 41.6 40.0 - 54.0 %    Mean Corpuscular Volume 87 82 - 98 fL    Mean Corpuscular Hemoglobin 28.4 27.0 - 31.0 pg    Mean Corpuscular Hemoglobin Conc 32.7 32.0 - 36.0 g/dL    RDW 12.3 11.5 - 14.5 %    Platelets 232 150 - 350 K/uL    MPV 11.7 9.2 - 12.9 fL    Immature Granulocytes 0.3 0.0 - 0.5 %    Gran # (ANC) 3.8 1.8 - 7.7 K/uL    Immature Grans (Abs) 0.02 0.00 - 0.04 K/uL    Lymph # 1.9 1.0 - 4.8 K/uL    Mono # 0.6 0.3 - 1.0 K/uL    Eos # 0.1 0.0 - 0.5 K/uL    Baso # 0.03 0.00 - 0.20 K/uL    nRBC 0 0 /100 WBC    Gran% 58.7 38.0 - 73.0 %    Lymph% 29.6 18.0 - 48.0 %    Mono% 8.9 4.0 - 15.0 %    Eosinophil% 2.0 0.0 - 8.0 %    Basophil% 0.5 0.0 - 1.9 %    Differential Method Automated    Comprehensive metabolic panel   Result Value Ref Range    Sodium 138 136 - 145 mmol/L    Potassium 3.9 3.5 - 5.1 mmol/L    Chloride 102 " 95 - 110 mmol/L    CO2 25 23 - 29 mmol/L    Glucose 237 (H) 70 - 110 mg/dL    BUN, Bld 12 6 - 20 mg/dL    Creatinine 1.2 0.5 - 1.4 mg/dL    Calcium 9.4 8.7 - 10.5 mg/dL    Total Protein 7.7 6.0 - 8.4 g/dL    Albumin 4.0 3.5 - 5.2 g/dL    Total Bilirubin 0.5 0.1 - 1.0 mg/dL    Alkaline Phosphatase 74 55 - 135 U/L    AST 19 10 - 40 U/L    ALT 37 10 - 44 U/L    Anion Gap 11 8 - 16 mmol/L    eGFR if African American >60 >60 mL/min/1.73 m^2    eGFR if non African American >60 >60 mL/min/1.73 m^2   Troponin I #1   Result Value Ref Range    Troponin I <0.006 0.000 - 0.026 ng/mL   B-Type natriuretic peptide (BNP)   Result Value Ref Range    BNP <10 0 - 99 pg/mL   POCT glucose   Result Value Ref Range    POCT Glucose 203 (H) 70 - 110 mg/dL         Imaging Results:  Imaging Results          X-Ray Chest AP Portable (Final result)  Result time 09/15/20 20:20:54    Final result by Dalton Nazario Jr., MD (09/15/20 20:20:54)                 Impression:      No acute cardiopulmonary disease.      Electronically signed by: Dalton Nazario Jr., MD  Date:    09/15/2020  Time:    20:20             Narrative:    EXAMINATION:  XR CHEST AP PORTABLE    CLINICAL HISTORY:  Chest Pain;    COMPARISON:  None    FINDINGS:  The lungs are clear. The cardiac silhouette and mediastinum are within normal limits. The remaining osseous structures and soft tissues are within normal limits.                                 The EKG was ordered, reviewed, and independently interpreted by the ED provider.  Interpretation time: 19:29  Rate: 92 BPM  Rhythm: normal sinus rhythm  Interpretation: Cannot rule out Anterior infarct, age undetermined. No STEMI.             The Emergency Provider reviewed the vital signs and test results, which are outlined above.     ED Discussion     11:00 PM: Reassessed pt at this time. Discussed with pt all pertinent ED information and results. Discussed pt dx and plan of tx. Gave pt all f/u and return to the ED instructions. All  questions and concerns were addressed at this time. Pt expresses understanding of information and instructions, and is comfortable with plan to discharge. Pt is stable for discharge.    I discussed with patient and/or family/caretaker that evaluation in the ED does not suggest any emergent or life threatening medical conditions requiring immediate intervention beyond what was provided in the ED, and I believe patient is safe for discharge.  Regardless, an unremarkable evaluation in the ED does not preclude the development or presence of a serious of life threatening condition. As such, patient was instructed to return immediately for any worsening or change in current symptoms.         Medical Decision Making:   Clinical Tests:   Lab Tests: Ordered and Reviewed  Radiological Study: Ordered and Reviewed  Medical Tests: Ordered and Reviewed           ED Medication(s):  Medications   aspirin tablet 325 mg (325 mg Oral Given 9/15/20 2104)       There are no discharge medications for this patient.              Scribe Attestation:   Scribe #1: I performed the above scribed service and the documentation accurately describes the services I performed. I attest to the accuracy of the note.     Attending:   Physician Attestation Statement for Scribe #1: I, Hugo Jernigan MD, personally performed the services described in this documentation, as scribed by Margie Walsh, in my presence, and it is both accurate and complete.           Clinical Impression       ICD-10-CM ICD-9-CM   1. Chest pain  R07.9 786.50       Disposition:   Disposition: Discharged  Condition: Stable       Hugo Jernigan MD  09/16/20 [id number]

## 2020-12-01 PROBLEM — I25.2 HISTORY OF MI (MYOCARDIAL INFARCTION): Status: ACTIVE | Noted: 2020-01-01

## 2020-12-01 PROBLEM — E78.2 MIXED HYPERLIPIDEMIA: Status: ACTIVE | Noted: 2020-01-01

## 2020-12-01 PROBLEM — E11.9 TYPE 2 DIABETES MELLITUS, WITH LONG-TERM CURRENT USE OF INSULIN: Status: ACTIVE | Noted: 2020-01-01

## 2020-12-01 PROBLEM — R07.9 CHEST PAIN ON EXERTION: Status: ACTIVE | Noted: 2020-01-01

## 2020-12-01 PROBLEM — R50.9 FEVER: Status: ACTIVE | Noted: 2020-01-01

## 2020-12-01 PROBLEM — Z79.4 TYPE 2 DIABETES MELLITUS, WITH LONG-TERM CURRENT USE OF INSULIN: Status: ACTIVE | Noted: 2020-01-01

## 2020-12-01 PROBLEM — R50.9 FEVER: Status: RESOLVED | Noted: 2020-01-01 | Resolved: 2020-01-01

## 2020-12-01 NOTE — PROGRESS NOTES
Subjective:      Patient ID: Osmany Stevens is a 50 y.o. male.    Chief Complaint: Establish Care and Chest Pain    Chest pain with exertion.   Recently moved her from [place]. Pt states that he had a heart attack many years ago. Has a stent.   Has been years since he has seen a cardiologist.     Chest Pain   This is a recurrent problem. The current episode started more than 1 month ago. The problem occurs intermittently. The problem has been waxing and waning. The pain is present in the substernal region. The pain is at a severity of 2/10. The quality of the pain is described as heavy. The pain does not radiate. Associated symptoms include exertional chest pressure. Pertinent negatives include no abdominal pain, back pain, claudication, cough, diaphoresis, dizziness, fever, headaches, hemoptysis, irregular heartbeat, leg pain, lower extremity edema, malaise/fatigue, nausea, near-syncope, numbness, orthopnea, palpitations, PND, shortness of breath, sputum production, syncope, vomiting or weakness. The pain is aggravated by exertion. Risk factors include lack of exercise, male gender and obesity.   His past medical history is significant for MI.   Pertinent negatives for past medical history include no aneurysm, no anxiety/panic attacks and no aortic aneurysm. Prior diagnostic workup includes cardiac catheterization.     Patient Active Problem List   Diagnosis    Mixed hyperlipidemia    Type 2 diabetes mellitus, with long-term current use of insulin    Chest pain on exertion    History of MI (myocardial infarction)   Currently on rosuvastatin but doesn't know the dosage.  On lantus as well sliding scale. Has been out of lantus for months.    No current outpatient medications on file.    Review of Systems   Constitutional: Negative for activity change, appetite change, chills, diaphoresis, fatigue, fever, malaise/fatigue and unexpected weight change.   HENT: Negative.  Negative for congestion, hearing loss,  "postnasal drip, rhinorrhea, sore throat, trouble swallowing and voice change.    Eyes: Negative.  Negative for visual disturbance.   Respiratory: Positive for chest tightness. Negative for cough, hemoptysis, sputum production, choking, shortness of breath, wheezing and stridor.    Cardiovascular: Positive for chest pain. Negative for palpitations, orthopnea, claudication, leg swelling, syncope, PND and near-syncope.   Gastrointestinal: Negative for abdominal distention, abdominal pain, blood in stool, constipation, diarrhea, nausea and vomiting.   Endocrine: Negative for cold intolerance, heat intolerance, polydipsia and polyuria.   Genitourinary: Negative.  Negative for difficulty urinating and frequency.   Musculoskeletal: Negative for arthralgias, back pain, gait problem, joint swelling and myalgias.   Skin: Negative for color change, pallor, rash and wound.   Neurological: Negative for dizziness, tremors, weakness, light-headedness, numbness and headaches.   Hematological: Negative for adenopathy.   Psychiatric/Behavioral: Negative for behavioral problems, confusion, self-injury, sleep disturbance and suicidal ideas. The patient is not nervous/anxious.      Objective:   /84 (BP Location: Left arm, Patient Position: Sitting, BP Method: Large (Manual))   Pulse 110   Temp (!) 100.8 °F (38.2 °C) (Tympanic)   Ht 5' 10" (1.778 m)   Wt (!) 137 kg (302 lb 0.5 oz)   SpO2 98%   BMI 43.34 kg/m²     Physical Exam  Vitals signs and nursing note reviewed.   Constitutional:       General: He is not in acute distress.     Appearance: He is well-developed. He is not diaphoretic.      Interventions: He is not intubated.  HENT:      Head: Normocephalic and atraumatic.   Cardiovascular:      Rate and Rhythm: Normal rate and regular rhythm.      Heart sounds: Normal heart sounds. No murmur. No friction rub. No gallop.    Pulmonary:      Effort: Pulmonary effort is normal. No tachypnea, bradypnea, accessory muscle usage, " prolonged expiration, respiratory distress or retractions. He is not intubated.      Breath sounds: No decreased air movement. Decreased breath sounds present. No wheezing or rales.   Skin:     General: Skin is warm.   Neurological:      Mental Status: He is alert and oriented to person, place, and time.   Psychiatric:         Behavior: Behavior normal.         Thought Content: Thought content normal.         Judgment: Judgment normal.       Lab Visit on 12/01/2020   Component Date Value Ref Range Status    Microalbumin, Urine 12/01/2020 8.0  ug/mL Final    Creatinine, Urine 12/01/2020 124.0  23.0 - 375.0 mg/dL Final    Comment: The random urine reference ranges provided were established   for 24 hour urine collections.  No reference ranges exist for  random urine specimens.  Correlate clinically.      Microalb/Creat Ratio 12/01/2020 6.5  0.0 - 30.0 ug/mg Final   Lab Visit on 12/01/2020   Component Date Value Ref Range Status    SARS-CoV2 (COVID-19) Qualitative P* 12/01/2020 Not Detected  Not Detected Final    Comment: This test utilizes a real-time reverse transcription  polymerase chain reaction procedure to amplify and   detect the SARS-CoV-2 RdRp and N genes.    The analytical sensitivity (limit of detection) of   this assay is 100 copies/mL.   A Detected result is considered positive for COVID-19.  This patient is considered infected with the   SARS-CoV-2 virus and is presumed to be contagious.    A Not Detected result means that SARS-CoV-2 RNA is not  present above the limit of detection. It does not rule  out the possibility of COVID-19 and should not be the  sole basis for treatment decisions.  If COVID-19 is   strongly suspected based on clinical and exposure   history,re-testing should be considered.    This test is only for use under Food and Drug   Administration s Emergency Use Authorization (EUA).   Commercial reagents are provided by [employer].  Performance characteristics of the EUA  have been   independently verified by [facility]   Department of Pathology and L                           aboratory Medicine.       Lab Visit on 12/01/2020   Component Date Value Ref Range Status    WBC 12/01/2020 7.64  3.90 - 12.70 K/uL Final    RBC 12/01/2020 5.26  4.60 - 6.20 M/uL Final    Hemoglobin 12/01/2020 15.1  14.0 - 18.0 g/dL Final    Hematocrit 12/01/2020 47.5  40.0 - 54.0 % Final    MCV 12/01/2020 90  82 - 98 fL Final    MCH 12/01/2020 28.7  27.0 - 31.0 pg Final    MCHC 12/01/2020 31.8* 32.0 - 36.0 g/dL Final    RDW 12/01/2020 12.6  11.5 - 14.5 % Final    Platelets 12/01/2020 268  150 - 350 K/uL Final    MPV 12/01/2020 11.2  9.2 - 12.9 fL Final    Immature Granulocytes 12/01/2020 0.3  0.0 - 0.5 % Final    Gran # (ANC) 12/01/2020 4.5  1.8 - 7.7 K/uL Final    Immature Grans (Abs) 12/01/2020 0.02  0.00 - 0.04 K/uL Final    Comment: Mild elevation in immature granulocytes is non specific and   can be seen in a variety of conditions including stress response,   acute inflammation, trauma and pregnancy. Correlation with other   laboratory and clinical findings is essential.      Lymph # 12/01/2020 2.1  1.0 - 4.8 K/uL Final    Mono # 12/01/2020 0.8  0.3 - 1.0 K/uL Final    Eos # 12/01/2020 0.2  0.0 - 0.5 K/uL Final    Baso # 12/01/2020 0.05  0.00 - 0.20 K/uL Final    nRBC 12/01/2020 0  0 /100 WBC Final    Gran % 12/01/2020 59.2  38.0 - 73.0 % Final    Lymph % 12/01/2020 27.0  18.0 - 48.0 % Final    Mono % 12/01/2020 9.9  4.0 - 15.0 % Final    Eosinophil % 12/01/2020 2.9  0.0 - 8.0 % Final    Basophil % 12/01/2020 0.7  0.0 - 1.9 % Final    Differential Method 12/01/2020 Automated   Final    Sodium 12/01/2020 139  136 - 145 mmol/L Final    Potassium 12/01/2020 4.5  3.5 - 5.1 mmol/L Final    Chloride 12/01/2020 103  95 - 110 mmol/L Final    CO2 12/01/2020 27  23 - 29 mmol/L Final    Glucose 12/01/2020 216* 70 - 110 mg/dL Final    BUN 12/01/2020 18  6 - 20 mg/dL Final     Creatinine 12/01/2020 1.2  0.5 - 1.4 mg/dL Final    Calcium 12/01/2020 9.9  8.7 - 10.5 mg/dL Final    Total Protein 12/01/2020 8.1  6.0 - 8.4 g/dL Final    Albumin 12/01/2020 4.1  3.5 - 5.2 g/dL Final    Total Bilirubin 12/01/2020 0.5  0.1 - 1.0 mg/dL Final    Comment: For infants and newborns, interpretation of results should be based  on gestational age, weight and in agreement with clinical  observations.  Premature Infant recommended reference ranges:  Up to 24 hours.............<8.0 mg/dL  Up to 48 hours............<12.0 mg/dL  3-5 days..................<15.0 mg/dL  6-29 days.................<15.0 mg/dL      Alkaline Phosphatase 12/01/2020 79  55 - 135 U/L Final    AST 12/01/2020 22  10 - 40 U/L Final    ALT 12/01/2020 37  10 - 44 U/L Final    Anion Gap 12/01/2020 9  8 - 16 mmol/L Final    eGFR if African American 12/01/2020 >60  >60 mL/min/1.73 m^2 Final    eGFR if non African American 12/01/2020 >60  >60 mL/min/1.73 m^2 Final    Comment: Calculation used to obtain the estimated glomerular filtration  rate (eGFR) is the CKD-EPI equation.       Hemoglobin A1C 12/01/2020 10.1* 4.0 - 5.6 % Final    Comment: ADA Screening Guidelines:  5.7-6.4%  Consistent with prediabetes  >or=6.5%  Consistent with diabetes  High levels of fetal hemoglobin interfere with the HbA1C  assay. Heterozygous hemoglobin variants (HbS, HgC, etc)do  not significantly interfere with this assay.   However, presence of multiple variants may affect accuracy.      Estimated Avg Glucose 12/01/2020 243* 68 - 131 mg/dL Final    Cholesterol 12/01/2020 229* 120 - 199 mg/dL Final    Comment: The National Cholesterol Education Program (NCEP) has set the  following guidelines (reference ranges) for Cholesterol:  Optimal.....................<200 mg/dL  Borderline High.............200-239 mg/dL  High........................> or = 240 mg/dL      Triglycerides 12/01/2020 253* 30 - 150 mg/dL Final    Comment: The National Cholesterol Education  Program (NCEP) has set the  following guidelines (reference values) for triglycerides:  Normal......................<150 mg/dL  Borderline High.............150-199 mg/dL  High........................200-499 mg/dL      HDL 12/01/2020 39* 40 - 75 mg/dL Final    Comment: The National Cholesterol Education Program (NCEP) has set the  following guidelines (reference values) for HDL Cholesterol:  Low...............<40 mg/dL  Optimal...........>60 mg/dL      LDL Cholesterol 12/01/2020 139.4  63.0 - 159.0 mg/dL Final    Comment: The National Cholesterol Education Program (NCEP) has set the  following guidelines (reference values) for LDL Cholesterol:  Optimal.......................<130 mg/dL  Borderline High...............130-159 mg/dL  High..........................160-189 mg/dL  Very High.....................>190 mg/dL      HDL/Cholesterol Ratio 12/01/2020 17.0* 20.0 - 50.0 % Final    Total Cholesterol/HDL Ratio 12/01/2020 5.9* 2.0 - 5.0 Final    Non-HDL Cholesterol 12/01/2020 190  mg/dL Final    Comment: Risk category and Non-HDL cholesterol goals:  Coronary heart disease (CHD)or equivalent (10-year risk of CHD >20%):  Non-HDL cholesterol goal     <130 mg/dL  Two or more CHD risk factors and 10-year risk of CHD <= 20%:  Non-HDL cholesterol goal     <160 mg/dL  0 to 1 CHD risk factor:  Non-HDL cholesterol goal     <190 mg/dL      Troponin I 12/01/2020 <0.006  0.000 - 0.026 ng/mL Final    Comment: The reference interval for Troponin I represents the 99th percentile   cutoff   for our facility and is consistent with 3rd generation assay   performance.         Assessment:     1. Fever, unspecified fever cause    2. Chest pain on exertion    3. Type 2 diabetes mellitus with other specified complication, with long-term current use of insulin    4. Mixed hyperlipidemia    5. History of MI (myocardial infarction)    6. Abnormal breath sounds      Plan:   Fever, unspecified fever cause  -     COVID-19 Routine Screening;  Future; Expected date: 12/01/2020  -     CBC Auto Differential; Future; Expected date: 12/01/2020  -     X-Ray Chest PA And Lateral; Future; Expected date: 12/01/2020    Chest pain on exertion  -     CBC Auto Differential; Future; Expected date: 12/01/2020  -     Comprehensive Metabolic Panel; Future  -     X-Ray Chest PA And Lateral; Future; Expected date: 12/01/2020  -     EKG 12-lead; Future  -     Lipid Panel; Future; Expected date: 12/01/2020  -     Troponin I; Future; Expected date: 12/01/2020  -     Ambulatory referral/consult to Cardiology    Type 2 diabetes mellitus with other specified complication, with long-term current use of insulin  -     Comprehensive Metabolic Panel; Future  -     Hemoglobin A1C; Future; Expected date: 12/01/2020  -     Microalbumin/Creatinine Ratio, Urine; Future; Expected date: 12/01/2020    Mixed hyperlipidemia  -     Comprehensive Metabolic Panel; Future  -     Lipid Panel; Future; Expected date: 12/01/2020    History of MI (myocardial infarction)  -     Troponin I; Future; Expected date: 12/01/2020    Abnormal breath sounds  -     X-Ray Chest PA And Lateral; Future; Expected date: 12/01/2020    -needs to get set up with a cardiologist. Pt states that the chest pain has been going on for months.   -red flags discussed in detail. Advised pt to go to the ER if symptoms worsen.     Follow up in 4 weeks (on 12/29/2020), or if symptoms worsen or fail to improve.

## 2020-12-15 PROBLEM — R07.9 CHEST PAIN: Status: ACTIVE | Noted: 2020-01-01

## 2020-12-15 PROBLEM — E66.01 MORBIDLY OBESE: Status: ACTIVE | Noted: 2020-01-01

## 2020-12-15 PROBLEM — I10 ESSENTIAL HYPERTENSION: Status: ACTIVE | Noted: 2020-01-01

## 2020-12-15 PROBLEM — G47.33 OSA ON CPAP: Status: ACTIVE | Noted: 2020-01-01

## 2020-12-15 NOTE — HPI
Osmany Stevens is a 50 year old male who presented to [facility] from Cardiology clinic due to chest pain. He reports that he has been having intermittent chest pain since Stent placement in 2011. He recently moved to [place] from [place], [place]. His current medical conditions include CAD s/p stent placement (2011), HTN, HLP, Uncontrolled DM Type II, SHANNON on CPAP ( noncompliant), Old MI. He reports that he has been having daily exertional chest pain since 2011. He is unable to tell me any of his current medications. He has been noncompliant with Cpap therapy and has not been adherent to any dietary restrictions. ED workup revealed troponin negative x 2, K+ 4.2, Cr 1.2, Rapid Covid 19 negative, glucose 368, H/H 14/45. Echo revealed EF 60%, normal diastolic function, elevated PAS. He was placed in observation under the care of hospital medicine. Cardiology consulted to assist with medical management. Chart reviewed, patient seen and examined. Denies chest pain at rest. He does continues to endorse exertional chest pain for the past 9 years. He denies compliance with dietary restrictions. He is unsure of his current medications and what he is actually taking. Medications adjusted for CAD today. Needs MPI stress test in AM for further ischemic evaluation. Will request medical records from [facility]. He does endorse issues with PND and orthopnea over the past week or so. Further recs to follow.

## 2020-12-15 NOTE — SUBJECTIVE & OBJECTIVE
Interval History: Patient is not in any acute distress, his niece has just returned to the room, made aware that unsure of when room will become available.     Review of Systems   Constitutional: Negative for chills, diaphoresis, fatigue and fever.   HENT: Negative for congestion and sore throat.    Eyes: Negative for discharge and redness.   Respiratory: Negative for cough and shortness of breath.    Cardiovascular: Positive for chest pain. Negative for palpitations and leg swelling.   Gastrointestinal: Negative for abdominal pain, nausea and vomiting.   Genitourinary: Negative for dysuria.   Musculoskeletal: Negative for arthralgias and myalgias.   Skin: Negative for rash.   Neurological: Positive for headaches. Negative for dizziness, syncope, weakness, light-headedness and numbness.   Hematological: Does not bruise/bleed easily.   Psychiatric/Behavioral: Negative for agitation and confusion.     Objective:     Vital Signs (Most Recent):  Temp: 98.2 °F (36.8 °C) (12/15/20 0908)  Pulse: 102 (12/15/20 1042)  Resp: 18 (12/15/20 1042)  BP: (!) 156/88 (12/15/20 1042)  SpO2: 97 % (12/15/20 1042) Vital Signs (24h Range):  Temp:  [98.2 °F (36.8 °C)] 98.2 °F (36.8 °C)  Pulse:  [102-108] 102  Resp:  [18-20] 18  SpO2:  [96 %-97 %] 97 %  BP: (133-156)/(76-92) 156/88     Weight: (!) 141 kg (310 lb 13.6 oz)  Body mass index is 44.6 kg/m².  No intake or output data in the 24 hours ending 12/15/20 1154   Physical Exam  Constitutional:       General: He is not in acute distress.     Appearance: He is well-developed. He is obese. He is not diaphoretic.   HENT:      Head: Normocephalic and atraumatic.      Nose: Nose normal.   Eyes:      General:         Right eye: No discharge.         Left eye: No discharge.      Conjunctiva/sclera: Conjunctivae normal.      Pupils: Pupils are equal, round, and reactive to light.   Neck:      Musculoskeletal: Normal range of motion and neck supple.      Thyroid: No thyromegaly.      Vascular: No  JVD.      Trachea: No tracheal deviation.   Cardiovascular:      Rate and Rhythm: Regular rhythm. Tachycardia present.   Pulmonary:      Effort: Pulmonary effort is normal. No respiratory distress.      Breath sounds: Normal breath sounds.   Chest:      Chest wall: No tenderness.   Abdominal:      General: Bowel sounds are normal. There is no distension.      Palpations: Abdomen is soft. There is no mass.      Tenderness: There is no abdominal tenderness.   Musculoskeletal: Normal range of motion.         General: No tenderness or deformity.   Skin:     General: Skin is warm and dry.      Capillary Refill: Capillary refill takes less than 2 seconds.      Findings: No erythema or rash.   Neurological:      Mental Status: He is alert and oriented to person, place, and time.   Psychiatric:         Behavior: Behavior normal.       Significant Labs:   CBC:   Recent Labs   Lab 12/15/20  0940   WBC 6.00   HGB 14.3   HCT 45.6        CMP:   Recent Labs   Lab 12/15/20  0940      K 4.2      CO2 27   *   BUN 18   CREATININE 1.2   CALCIUM 9.5   PROT 7.7   ALBUMIN 3.9   BILITOT 0.3   ALKPHOS 73   AST 12   ALT 27   ANIONGAP 8   EGFRNONAA >60     Cardiac Markers:   Recent Labs   Lab 12/15/20  0940   BNP <10       Troponin:   Recent Labs   Lab 12/15/20  0940   TROPONINI <0.006       Urine Studies:   Recent Labs   Lab 12/15/20  1054   COLORU Yellow   APPEARANCEUA Clear   PHUR 6.0   SPECGRAV 1.025   PROTEINUA Negative   GLUCUA 3+*   KETONESU Trace*   BILIRUBINUA Negative   OCCULTUA Negative   NITRITE Negative   UROBILINOGEN Negative   LEUKOCYTESUR Negative   BACTERIA None     All pertinent labs within the past 24 hours have been reviewed.    Significant Imaging: I have reviewed all pertinent imaging results/findings within the past 24 hours.

## 2020-12-15 NOTE — ED PROVIDER NOTES
SCRIBE #1 NOTE: I, Christiana Nunez, am scribing for, and in the presence of, Neal Luna MD. I have scribed the entire note.       History     Chief Complaint   Patient presents with    Chest Pain     sharp midsternal, onset this am; denies any associated symptoms      Review of patient's allergies indicates:   Allergen Reactions    Beesix          History of Present Illness     HPI    12/15/2020, 9:31 AM  History obtained from the patient      History of Present Illness: Osmany Stevens is a 50 y.o. male patient with a h/o DM type 2, HTN, MI, 1 heart stent, who presents to the Emergency Department for evaluation of sharp mid sternal/L-sided CP on exertion and at rest which onset this morning. Pt states that his CP on exertion started a few days ago, but the CP at rest began this morning. Symptoms are constant and moderate in severity. No mitigating or exacerbating factors reported. No associated sxs reported. Patient denies any fever, SOB, cough, palpitations, n/v, HA, light-headedness, dizziness, extremity weakness, extremity numbness, and all other sxs at this time. No prior Tx reported. No further complaints or concerns at this time.       Arrival mode: Personal transportation     PCP: Primary Doctor No      Past Medical History:  Past Medical History:   Diagnosis Date    Coronary artery disease     Diabetes mellitus, type 2     Hypertension        Past Surgical History:  History reviewed. No pertinent surgical history.      Family History:  Family History   Problem Relation Age of Onset    Diabetes Mother     Diabetes Father        Social History:   Social History     Tobacco Use    Smoking status: Never Smoker    Smokeless tobacco: Never Used   Substance and Sexual Activity    Alcohol use: Never     Frequency: Never    Drug use: Never    Sexual activity: Yes     Partners: Female        Review of Systems     Review of Systems   Constitutional: Negative for chills and fever.   HENT: Negative for  congestion and sore throat.    Respiratory: Negative for cough and shortness of breath.    Cardiovascular: Positive for chest pain (sharp mid sternal/L-sided CP on exertion and at rest). Negative for palpitations.   Gastrointestinal: Negative for abdominal pain, diarrhea, nausea and vomiting.   Genitourinary: Negative for dysuria.   Musculoskeletal: Negative for back pain.   Skin: Negative for rash.   Neurological: Negative for dizziness, weakness (extremity), light-headedness, numbness (extremity) and headaches.   Hematological: Does not bruise/bleed easily.   All other systems reviewed and are negative.       Physical Exam     Initial Vitals [12/15/20 0908]   BP Pulse Resp Temp SpO2   (!) 145/92 108 20 98.2 °F (36.8 °C) 96 %      MAP       --          Physical Exam  Nursing Notes and Vital Signs Reviewed.  Constitutional: Obese.  Head: Atraumatic. Normocephalic.  Eyes: EOM intact. No scleral icterus.  ENT: Mucous membranes are moist. Oropharynx is clear and symmetric.    Neck: Supple. Full ROM. No lymphadenopathy.  Cardiovascular: Tachycardic. Regular rhythm. No murmurs, rubs, or gallops. Distal pulses are 2+ and symmetric.  Pulmonary/Chest: No respiratory distress. Clear to auscultation bilaterally. No wheezing or rales.  Abdominal: Soft and non-distended.  There is no tenderness.  No rebound, guarding, or rigidity.  Genitourinary: No CVA tenderness  Musculoskeletal: Moves all extremities. No obvious deformities. No calf tenderness.  Skin: Warm and dry.  Neurological:  Alert, awake, and appropriate.  Normal speech.  No acute focal neurological deficits are appreciated.  Psychiatric: Normal affect. Good eye contact. Appropriate in content.     ED Course   Procedures  ED Vital Signs:  Vitals:    12/15/20 0908 12/15/20 0923 12/15/20 0951 12/15/20 1013   BP: (!) 145/92   133/76   Pulse: 108 104  104   Resp: 20   20   Temp: 98.2 °F (36.8 °C)      TempSrc: Oral      SpO2: 96%   96%   Weight: (!) 141 kg (310 lb 13.6 oz)  "     Height:   5' 10" (1.778 m)     12/15/20 1042   BP: (!) 156/88   Pulse: 102   Resp: 18   Temp:    TempSrc:    SpO2: 97%   Weight:    Height:        Abnormal Lab Results:  Labs Reviewed   CBC W/ AUTO DIFFERENTIAL - Abnormal; Notable for the following components:       Result Value    MCHC 31.4 (*)     All other components within normal limits   COMPREHENSIVE METABOLIC PANEL - Abnormal; Notable for the following components:    Glucose 368 (*)     All other components within normal limits   HIV 1 / 2 ANTIBODY   HEPATITIS C ANTIBODY   SARS-COV-2 RNA AMPLIFICATION, QUAL   B-TYPE NATRIURETIC PEPTIDE   CK   TROPONIN I   URINALYSIS, REFLEX TO URINE CULTURE        All Lab Results:  Results for orders placed or performed during the hospital encounter of 12/15/20   HIV 1/2 Ag/Ab (4th Gen)   Result Value Ref Range    HIV 1/2 Ag/Ab Negative Negative   Hepatitis C antibody   Result Value Ref Range    Hepatitis C Ab Negative Negative   CBC Auto Differential   Result Value Ref Range    WBC 6.00 3.90 - 12.70 K/uL    RBC 5.06 4.60 - 6.20 M/uL    Hemoglobin 14.3 14.0 - 18.0 g/dL    Hematocrit 45.6 40.0 - 54.0 %    MCV 90 82 - 98 fL    MCH 28.3 27.0 - 31.0 pg    MCHC 31.4 (L) 32.0 - 36.0 g/dL    RDW 12.3 11.5 - 14.5 %    Platelets 239 150 - 350 K/uL    MPV 12.0 9.2 - 12.9 fL    Immature Granulocytes 0.2 0.0 - 0.5 %    Gran # (ANC) 3.9 1.8 - 7.7 K/uL    Immature Grans (Abs) 0.01 0.00 - 0.04 K/uL    Lymph # 1.3 1.0 - 4.8 K/uL    Mono # 0.6 0.3 - 1.0 K/uL    Eos # 0.2 0.0 - 0.5 K/uL    Baso # 0.01 0.00 - 0.20 K/uL    nRBC 0 0 /100 WBC    Gran % 64.1 38.0 - 73.0 %    Lymph % 21.8 18.0 - 48.0 %    Mono % 10.5 4.0 - 15.0 %    Eosinophil % 3.2 0.0 - 8.0 %    Basophil % 0.2 0.0 - 1.9 %    Differential Method Automated    Comprehensive Metabolic Panel   Result Value Ref Range    Sodium 137 136 - 145 mmol/L    Potassium 4.2 3.5 - 5.1 mmol/L    Chloride 102 95 - 110 mmol/L    CO2 27 23 - 29 mmol/L    Glucose 368 (H) 70 - 110 mg/dL    BUN 18 6 " - 20 mg/dL    Creatinine 1.2 0.5 - 1.4 mg/dL    Calcium 9.5 8.7 - 10.5 mg/dL    Total Protein 7.7 6.0 - 8.4 g/dL    Albumin 3.9 3.5 - 5.2 g/dL    Total Bilirubin 0.3 0.1 - 1.0 mg/dL    Alkaline Phosphatase 73 55 - 135 U/L    AST 12 10 - 40 U/L    ALT 27 10 - 44 U/L    Anion Gap 8 8 - 16 mmol/L    eGFR if African American >60 >60 mL/min/1.73 m^2    eGFR if non African American >60 >60 mL/min/1.73 m^2   COVID-19 Rapid Screening   Result Value Ref Range    SARS-CoV-2 RNA, Amplification, Qual Negative Negative   BNP   Result Value Ref Range    BNP <10 0 - 99 pg/mL   CK   Result Value Ref Range    CPK 42 20 - 200 U/L   Troponin I   Result Value Ref Range    Troponin I <0.006 0.000 - 0.026 ng/mL         Imaging Results          X-Ray Chest AP Portable (Final result)  Result time 12/15/20 10:20:03    Final result by Stewart Aguiar MD (12/15/20 10:20:03)                 Impression:      1.  Negative for acute process involving the chest.    2.  Stable findings as noted above.      Electronically signed by: Stewart Aguiar MD  Date:    12/15/2020  Time:    10:20             Narrative:    EXAMINATION:  XR CHEST AP PORTABLE    CLINICAL HISTORY:  chest pain;    COMPARISON:  Studies dating back to September 15, 2020    FINDINGS:  This study is lordotic in position.  EKG leads overlie.  The lungs are clear. The cardiac silhouette size is normal. The trachea is midline and the mediastinal width is normal. Negative for focal infiltrate, effusion or pneumothorax. Pulmonary vasculature is normal. Negative for osseous abnormalities. Mildly tortuous aorta again seen.  Left cardiophrenic fat pad again seen                                 The EKG was ordered, reviewed, and independently interpreted by the ED provider.  Interpretation time: 9:12  Rate: 105 BPM  Rhythm: Sinus tachycardia  Interpretation: Nonspecific T wave abnormality. No STEMI.      The Emergency Provider reviewed the vital signs and test results, which are outlined  above.     ED Discussion     10:36 AM: Re-evaluated pt. Pt is resting comfortably and is in no acute distress.  Pt states that he is comfortable with hospital admission.  D/w pt all pertinent results. D/w pt any concerns expressed at this time. Answered all questions. Pt expresses understanding at this time.    11:00 AM: Discussed case with CORA Levi ([facility]). Dr. Cervantes agrees with current care and management of pt and accepts admission.   Admitting Service: [facility]  Admit to: obs    Re-evaluated pt. I have discussed test results, shared treatment plan, and the need for admission with patient and family at bedside. Pt and family express understanding at this time and agree with all information. All questions answered. Pt and family have no further questions or concerns at this time. Pt is ready for admit.     MDM        Medical Decision Making:   Clinical Tests:   Lab Tests: Ordered and Reviewed  Radiological Study: Ordered and Reviewed  Medical Tests: Ordered and Reviewed           ED Medication(s):  Medications   nitroGLYCERIN 2% TD oint ointment 1 inch (has no administration in time range)   insulin regular injection 10 Units (has no administration in time range)       New Prescriptions    No medications on file               Scribe Attestation:   Scribe #1: I performed the above scribed service and the documentation accurately describes the services I performed. I attest to the accuracy of the note.     Attending:   Physician Attestation Statement for Scribe #1: I, Neal Luna MD, personally performed the services described in this documentation, as scribed by Christiana Nunez, in my presence, and it is both accurate and complete.           Clinical Impression       ICD-10-CM ICD-9-CM   1. Chest pain  R07.9 786.50   2. Coronary artery disease with unstable angina pectoris, unspecified vessel or lesion type, unspecified whether native or transplanted heart  I25.110  414.00     411.1       Disposition:   Disposition: Placed in Observation  Condition: Ranulfo Luna MD  12/15/20 1141

## 2020-12-15 NOTE — H&P
[facility] Hospital Medicine  History & Physical    Patient Name: Osmany Stevens  MRN: [id number]  Admission Date: 12/15/2020  Attending Physician: Brandon Cervantes MD   Primary Care Provider: Primary Doctor No         Patient information was obtained from patient, relative(s), past medical records and ER records.     Subjective:     Principal Problem:Chest pain    Chief Complaint:   Chief Complaint   Patient presents with    Chest Pain     sharp midsternal, onset this am; denies any associated symptoms         HPI: 50-year-old Male with a PMHx significant for uncontrolled HTN, uncontrolled T2DM, MI, 1 Stent Placement presented to the ED this morning after experiencing CP this morning on the way to his cardiology appointment. Patient states that his CP is felt most in his mid-chest, it is 10/10 in severity without radiation, and he states that he was concerned because it felt similar to how it felt when he had a heart attack. Patient states the only associated symptom is a headache, he denies weakness/numbness, diaphoresis, palpitations, arm/shoulder/jaw pain, N/V, dizziness, abdominal pain, leg pain/cramping. He states that when he rests it will alleviate the CP, and the CP is brought on by a specific amout of exertion typically, but he can not specify the amount. He states that it has been occurring off and on for a while now but could not specify for how long.    Patient does not seem to be taking most of his medications. He could not say what he was on. He states that he was never able to take his insulin because he was not supplied with needles and syringes and also that his glucometer was not working.   ED workup shows: initial ECG revealed no abnormalities when comparing it to a previous one obtained in Sept. 2020. BNP, CK, and Troponins WNL. BP is elevated, glucose is elevated.  He is a full code and his SDM is his niece, Katarina.  He will be kept on OBS for chest pain under the care of  hospital medicine.       Past Medical History:   Diagnosis Date    Coronary artery disease     Diabetes mellitus, type 2     Hypertension        History reviewed. No pertinent surgical history.    Review of patient's allergies indicates:   Allergen Reactions    Beesix        No current facility-administered medications on file prior to encounter.      Current Outpatient Medications on File Prior to Encounter   Medication Sig    clopidogreL (PLAVIX) 75 mg tablet Take 75 mg by mouth once daily.    JANUVIA 100 mg Tab Take 100 mg by mouth once daily.    lisinopriL (PRINIVIL,ZESTRIL) 20 MG tablet Take 20 mg by mouth once daily.    metFORMIN (GLUCOPHAGE) 1000 MG tablet Take 1,000 mg by mouth 2 (two) times daily.    rosuvastatin (CRESTOR) 40 MG Tab Take 1 tablet (40 mg total) by mouth every evening.    blood sugar diagnostic Strp Use to check blood sugar three times a day    blood-glucose meter (ACCU-CHEK BRINDA PLUS METER) Misc Use as instructed    insulin glargine (LANTUS) 100 unit/mL injection Inject 20 Units into the skin every evening.    lancets (ACCU-CHEK SOFTCLIX LANCETS) Misc Use to check blood sugar three times a day     Family History     Problem Relation (Age of Onset)    Diabetes Mother, Father        Tobacco Use    Smoking status: Never Smoker    Smokeless tobacco: Never Used   Substance and Sexual Activity    Alcohol use: Never     Frequency: Never    Drug use: Never    Sexual activity: Yes     Partners: Female     Review of Systems   Constitutional: Negative for chills, diaphoresis, fatigue and fever.   HENT: Negative for congestion and sore throat.    Eyes: Negative for discharge and redness.   Respiratory: Negative for cough and shortness of breath.    Cardiovascular: Positive for chest pain. Negative for palpitations and leg swelling.   Gastrointestinal: Negative for abdominal pain, nausea and vomiting.   Genitourinary: Negative for dysuria and flank pain.   Musculoskeletal: Negative for  arthralgias and myalgias.   Skin: Negative for rash.   Neurological: Positive for headaches. Negative for dizziness, syncope, light-headedness and numbness.   Hematological: Does not bruise/bleed easily.   Psychiatric/Behavioral: Negative for agitation and confusion.     Objective:     Vital Signs (Most Recent):  Temp: 98.2 °F (36.8 °C) (12/15/20 0908)  Pulse: 104 (12/15/20 1159)  Resp: 20 (12/15/20 1159)  BP: (!) 149/76 (12/15/20 1159)  SpO2: 98 % (12/15/20 1159) Vital Signs (24h Range):  Temp:  [98.2 °F (36.8 °C)] 98.2 °F (36.8 °C)  Pulse:  [102-108] 104  Resp:  [18-20] 20  SpO2:  [96 %-98 %] 98 %  BP: (133-156)/(76-92) 149/76     Weight: (!) 141 kg (310 lb 13.6 oz)  Body mass index is 44.6 kg/m².    Physical Exam  Vitals signs and nursing note reviewed.   Constitutional:       General: He is not in acute distress.     Appearance: He is obese. He is not diaphoretic.   HENT:      Head: Normocephalic and atraumatic.      Right Ear: External ear normal.      Left Ear: External ear normal.      Nose: Nose normal.   Eyes:      General:         Right eye: No discharge.         Left eye: No discharge.      Pupils: Pupils are equal, round, and reactive to light.   Neck:      Musculoskeletal: Normal range of motion and neck supple.   Cardiovascular:      Rate and Rhythm: Regular rhythm. Tachycardia present.   Pulmonary:      Effort: Pulmonary effort is normal.      Breath sounds: Normal breath sounds.   Abdominal:      General: Bowel sounds are normal.      Palpations: Abdomen is soft. There is no mass.      Tenderness: There is no abdominal tenderness.   Musculoskeletal: Normal range of motion.   Skin:     General: Skin is warm and dry.   Neurological:      Mental Status: He is alert and oriented to person, place, and time.   Psychiatric:         Behavior: Behavior normal.           CRANIAL NERVES     CN III, IV, VI   Pupils are equal, round, and reactive to light.       Significant Labs:   CBC:   Recent Labs   Lab  12/15/20  0940   WBC 6.00   HGB 14.3   HCT 45.6        CMP:   Recent Labs   Lab 12/15/20  0940      K 4.2      CO2 27   *   BUN 18   CREATININE 1.2   CALCIUM 9.5   PROT 7.7   ALBUMIN 3.9   BILITOT 0.3   ALKPHOS 73   AST 12   ALT 27   ANIONGAP 8   EGFRNONAA >60     Cardiac Markers:   Recent Labs   Lab 12/15/20  0940   BNP <10     Troponin:   Recent Labs   Lab 12/15/20  0940   TROPONINI <0.006     Urine Studies:   Recent Labs   Lab 12/15/20  1054   COLORU Yellow   APPEARANCEUA Clear   PHUR 6.0   SPECGRAV 1.025   PROTEINUA Negative   GLUCUA 3+*   KETONESU Trace*   BILIRUBINUA Negative   OCCULTUA Negative   NITRITE Negative   UROBILINOGEN Negative   LEUKOCYTESUR Negative   BACTERIA None     All pertinent labs within the past 24 hours have been reviewed.    Significant Imaging: I have reviewed all pertinent imaging results/findings within the past 24 hours.    Assessment/Plan:     * Chest pain  --initial troponin 0.006  --ECG shows no significant abnormalities when compared to one done in Sept. 2020.   --will trend troponins  --BNP and CPK WNL  --cardiology consulted  --nitroglycerine paste  --telemetry monitoring    Type 2 diabetes mellitus, with long-term current use of insulin  --accuchecks and SSI  --Levemir q HS  --HA1C pending  --diabetic diet    Morbidly obese  --counseled on weight loss      History of MI (myocardial infarction)  --pt had MI in 2011 with stent placement in [place]  --Pt has not been taking any medications in at least 1 year.      Mixed hyperlipidemia  --crestor  --cardiac diet        VTE Risk Mitigation (From admission, onward)         Ordered     IP VTE HIGH RISK PATIENT  Once      12/15/20 1241     Place sequential compression device  Until discontinued      12/15/20 1241                   CORA Kapoor  Department of Hospital Medicine   [facility]

## 2020-12-15 NOTE — CONSULTS
[facility] -   Cardiology  Consult Note    Patient Name: Osmany Stevens  MRN: [id number]  Admission Date: 12/15/2020  Hospital Length of Stay: 0 days  Code Status: Full Code   Attending Provider: Brandon Cervantes MD   Consulting Provider: CORA Coates  Primary Care Physician: Primary Doctor No  Principal Problem:Chest pain    Patient information was obtained from patient, past medical records and ER records.     Inpatient consult to Cardiology  Consult performed by: CORA Adams  Consult ordered by: CORA Levi        Subjective:     Chief Complaint:  Chest pain      HPI:   Osmany Stevens is a 50 year old male who presented to [facility] from Cardiology clinic due to chest pain. He reports that he has been having intermittent chest pain since Stent placement in 2011. He recently moved to [place] from [place], [place]. His current medical conditions include CAD s/p stent placement (2011), HTN, HLP, Uncontrolled DM Type II, SHANNON on CPAP ( noncompliant), Old MI. He reports that he has been having daily exertional chest pain since 2011. He is unable to tell me any of his current medications. He has been noncompliant with Cpap therapy and has not been adherent to any dietary restrictions. ED workup revealed troponin negative x 2, K+ 4.2, Cr 1.2, Rapid Covid 19 negative, glucose 368, H/H 14/45. Echo revealed EF 60%, normal diastolic function, elevated PAS. He was placed in observation under the care of hospital medicine. Cardiology consulted to assist with medical management. Chart reviewed, patient seen and examined. Denies chest pain at rest. He does continues to endorse exertional chest pain for the past 9 years. He denies compliance with dietary restrictions. He is unsure of his current medications and what he is actually taking. Medications adjusted for CAD today. Needs MPI stress test in AM for further ischemic evaluation. Will request medical records from [facility]. He  does endorse issues with PND and orthopnea over the past week or so. Further recs to follow.     Past Medical History:   Diagnosis Date    Coronary artery disease     Diabetes mellitus, type 2     Hypertension        History reviewed. No pertinent surgical history.    Review of patient's allergies indicates:   Allergen Reactions    Beesix        No current facility-administered medications on file prior to encounter.      Current Outpatient Medications on File Prior to Encounter   Medication Sig    clopidogreL (PLAVIX) 75 mg tablet Take 75 mg by mouth once daily.    JANUVIA 100 mg Tab Take 100 mg by mouth once daily.    lisinopriL (PRINIVIL,ZESTRIL) 20 MG tablet Take 20 mg by mouth once daily.    metFORMIN (GLUCOPHAGE) 1000 MG tablet Take 1,000 mg by mouth 2 (two) times daily.    rosuvastatin (CRESTOR) 40 MG Tab Take 1 tablet (40 mg total) by mouth every evening.    blood sugar diagnostic Strp Use to check blood sugar three times a day    blood-glucose meter (ACCU-CHEK BRINDA PLUS METER) Misc Use as instructed    insulin glargine (LANTUS) 100 unit/mL injection Inject 20 Units into the skin every evening.    lancets (ACCU-CHEK SOFTCLIX LANCETS) Misc Use to check blood sugar three times a day     Family History     Problem Relation (Age of Onset)    Diabetes Mother, Father        Tobacco Use    Smoking status: Never Smoker    Smokeless tobacco: Never Used   Substance and Sexual Activity    Alcohol use: Never     Frequency: Never    Drug use: Never    Sexual activity: Yes     Partners: Female     Review of Systems   Constitution: Positive for malaise/fatigue.   HENT: Negative for hearing loss and hoarse voice.    Eyes: Negative for blurred vision and visual disturbance.   Cardiovascular: Positive for chest pain, dyspnea on exertion, orthopnea and paroxysmal nocturnal dyspnea. Negative for claudication, irregular heartbeat, leg swelling, near-syncope, palpitations and syncope.   Respiratory: Positive  for shortness of breath, sleep disturbances due to breathing and snoring. Negative for cough, hemoptysis and wheezing.    Endocrine: Negative for cold intolerance and heat intolerance.   Hematologic/Lymphatic: Bruises/bleeds easily (on ASA and Plavix).   Skin: Negative for color change, dry skin and nail changes.   Musculoskeletal: Positive for arthritis and back pain. Negative for joint pain and myalgias.   Gastrointestinal: Negative for bloating, abdominal pain, constipation, nausea and vomiting.   Genitourinary: Negative for dysuria, flank pain, hematuria and hesitancy.   Neurological: Positive for weakness. Negative for headaches, light-headedness, loss of balance, numbness and paresthesias.   Psychiatric/Behavioral: Negative for altered mental status.   Allergic/Immunologic: Negative for environmental allergies.     Objective:     Vital Signs (Most Recent):  Temp: 98.3 °F (36.8 °C) (12/15/20 1531)  Pulse: 110 (12/15/20 1531)  Resp: 18 (12/15/20 1531)  BP: 137/79 (12/15/20 1531)  SpO2: 96 % (12/15/20 1531) Vital Signs (24h Range):  Temp:  [98.2 °F (36.8 °C)-98.3 °F (36.8 °C)] 98.3 °F (36.8 °C)  Pulse:  [102-110] 110  Resp:  [18-20] 18  SpO2:  [96 %-98 %] 96 %  BP: (133-156)/(76-92) 137/79     Weight: (!) 140.6 kg (310 lb)  Body mass index is 44.48 kg/m².    SpO2: 96 %  O2 Device (Oxygen Therapy): room air    No intake or output data in the 24 hours ending 12/15/20 1547    Lines/Drains/Airways     Peripheral Intravenous Line                 Peripheral IV - Single Lumen 12/15/20 0940 18 G Left Antecubital less than 1 day                Physical Exam   Constitutional: He is oriented to person, place, and time. He appears well-developed and well-nourished. No distress.   HENT:   Head: Normocephalic and atraumatic.   Eyes: Pupils are equal, round, and reactive to light.   Neck: Normal range of motion and full passive range of motion without pain. Neck supple. No JVD present.   Cardiovascular: Regular rhythm, S1  normal, S2 normal and intact distal pulses. Tachycardia present. PMI is not displaced. Exam reveals distant heart sounds.   No murmur heard.  Pulses:       Radial pulses are 2+ on the right side and 2+ on the left side.        Dorsalis pedis pulses are 2+ on the right side and 2+ on the left side.   CHEST PAIN FREE   Pulmonary/Chest: Effort normal and breath sounds normal. No accessory muscle usage. No respiratory distress. He has no decreased breath sounds. He has no wheezes. He has no rales.   Abdominal: Soft. Bowel sounds are normal. He exhibits no distension. There is no abdominal tenderness.   Morbidly obese abdomen   Musculoskeletal: Normal range of motion.         General: No edema.      Right ankle: He exhibits no swelling.      Left ankle: He exhibits no swelling.   Neurological: He is alert and oriented to person, place, and time.   Skin: Skin is warm and dry. He is not diaphoretic. No cyanosis. Nails show no clubbing.   Psychiatric: He has a normal mood and affect. His speech is normal and behavior is normal. Judgment and thought content normal. Cognition and memory are normal.   Nursing note and vitals reviewed.      Significant Labs:   BMP:   Recent Labs   Lab 12/15/20  0940 12/15/20  1319   *  --      --    K 4.2  --      --    CO2 27  --    BUN 18  --    CREATININE 1.2  --    CALCIUM 9.5  --    MG  --  1.8   , CBC   Recent Labs   Lab 12/15/20  0940   WBC 6.00   HGB 14.3   HCT 45.6      , Troponin   Recent Labs   Lab 12/15/20  0940 12/15/20  1319   TROPONINI <0.006 <0.006    and All pertinent lab results from the last 24 hours have been reviewed.    Significant Imaging: Echocardiogram:   Transthoracic echo (TTE) complete (Cupid Only):   Results for orders placed or performed during the hospital encounter of 12/15/20   Echo Color Flow Doppler? Yes   Result Value Ref Range    BSA 2.64 m2    Ascending aorta 3.50 cm    STJ 3.54 cm    AV mean gradient 4 mmHg    Ao peak roberth 1.22 m/s     Ao VTI 18.88 cm    IVRT 82.78 msec    IVS 1.75 (A) 0.6 - 1.1 cm    LA size 3.71 cm    Left Atrium Major Axis 5.15 cm    Left Atrium Minor Axis 3.61 cm    LVIDd 3.77 3.5 - 6.0 cm    LVIDs 2.42 2.1 - 4.0 cm    LVOT diameter 2.26 cm    LVOT peak VTI 16.44 cm    Posterior Wall 1.71 (A) 0.6 - 1.1 cm    MV Peak A Bunny 0.67 m/s    E wave decelartion time 196.61 msec    MV Peak E Bunny 0.70 m/s    RA Major Axis 3.62 cm    Sinus 3.72 cm    TAPSE 1.99 cm    TR Max Bunny 2.25 m/s    TDI LATERAL 0.08 m/s    TDI SEPTAL 0.06 m/s    Ao root annulus 3.90 cm    LV Diastolic Volume 60.97 mL    LV Systolic Volume 20.49 mL    LVOT peak bunny 0.92 m/s    RVOT peak VTI 14.03 cm    RVOT peak bunny 0.76 m/s    PV mean gradient 2 mmHg    LV LATERAL E/E' RATIO 8.75 m/s    LV SEPTAL E/E' RATIO 11.67 m/s    FS 36 %    LV mass 270.46 g    Left Ventricle Relative Wall Thickness 0.91 cm    AV valve area 3.49 cm2    AV Velocity Ratio 0.75     AV index (prosthetic) 0.87     E/A ratio 1.04     Mean e' 0.07 m/s    LVOT area 4.0 cm2    LVOT stroke volume 65.92 cm3    AV peak gradient 6 mmHg    E/E' ratio 10.00 m/s    LV Systolic Volume Index 8.1 mL/m2    LV Diastolic Volume Index 24.25 mL/m2    LV Mass Index 108 g/m2    Triscuspid Valve Regurgitation Peak Gradient 20 mmHg    Right Atrial Pressure (from IVC) 3 mmHg    TV rest pulmonary artery pressure 23 mmHg    Narrative    · The left ventricle is normal in size with normal systolic function.   There is left ventricular concentric remodeling. The estimated ejection   fraction is 60%.  · Normal left ventricular diastolic function.  · Normal right ventricular size with normal right ventricular systolic   function.  · There is mild tricuspid valve regurgitation.  · Normal central venous pressure (3 mmHg).  · The estimated PA systolic pressure is 23 mmHg.  · The aortic root is mildly dilated.           Assessment and Plan:     * Chest pain  Reports exertional chest pain for the past 9 years  MPI stress test in  AM  Medical therapy adjusted for CAD today  Continue ASA, Statin, Plavix, BB, ACEi, Imdur  Dash diet, 2 gm sodium restriction  1500 ml fluid restriction  Keep NPO after MN  Further recs to follow.     Essential hypertension  Medical therapy adjusted for BP control  Continue BB, ACEi    SHANNON on CPAP  Reports noncompliance with nightly cpap therapy  Encouraged compliance    Morbidly obese  Encouraged weight loss after discharge    History of MI (myocardial infarction)  Medical therapy adjusted for OMT for CAD today    Type 2 diabetes mellitus, with long-term current use of insulin  Needs aggressive mgmt of diabetes as OP  Mgmt per primary team    Mixed hyperlipidemia  Continue statin        VTE Risk Mitigation (From admission, onward)         Ordered     IP VTE HIGH RISK PATIENT  Once      12/15/20 1241     Place sequential compression device  Until discontinued      12/15/20 1241                Thank you for your consult. I will follow-up with patient. Please contact us if you have any additional questions.    Ondina Malik, ARLINP-C  Cardiology   [facility]

## 2020-12-15 NOTE — HOSPITAL COURSE
Patient was kept on OBS for chest pain under the care of hospital medicine. Cardiology was consulted. Negative stress test. Medication adjustments made. Discussed with pt about importance of taking medication as ordered. Diabetes educator consulted, who went over checking blood sugar, gave patient working glucometer, educated patient on self injecting insulin and importance of diet compliance. Reg dietitian consulted, who reinforced diabetic diet choices and importance of watching diet closely. He will dc on: daily ASA, plavix, BB, imdur, statin, and ACEi for heart health. He will dc on lantus, metformin, and januvia for diabetes. He will f/u OP with diabetes clinic, Dr. Benson, and his PCP, Janet Rousseau. Patient verbalized understanding of all and agreed to take medications as ordered. Rx sent to [facility], [address] Discussed plan with gwendolyn via telephone. Patient was seen and examined today and deemed stable for discharge home.

## 2020-12-15 NOTE — ASSESSMENT & PLAN NOTE
Reports exertional chest pain for the past 9 years  MPI stress test in AM  Medical therapy adjusted for CAD today  Continue ASA, Statin, Plavix, BB, ACEi, Imdur  Dash diet, 2 gm sodium restriction  1500 ml fluid restriction  Keep NPO after MN  Further recs to follow.

## 2020-12-15 NOTE — SUBJECTIVE & OBJECTIVE
Past Medical History:   Diagnosis Date    Coronary artery disease     Diabetes mellitus, type 2     Hypertension        History reviewed. No pertinent surgical history.    Review of patient's allergies indicates:   Allergen Reactions    Beesix        No current facility-administered medications on file prior to encounter.      Current Outpatient Medications on File Prior to Encounter   Medication Sig    clopidogreL (PLAVIX) 75 mg tablet Take 75 mg by mouth once daily.    JANUVIA 100 mg Tab Take 100 mg by mouth once daily.    lisinopriL (PRINIVIL,ZESTRIL) 20 MG tablet Take 20 mg by mouth once daily.    metFORMIN (GLUCOPHAGE) 1000 MG tablet Take 1,000 mg by mouth 2 (two) times daily.    rosuvastatin (CRESTOR) 40 MG Tab Take 1 tablet (40 mg total) by mouth every evening.    blood sugar diagnostic Strp Use to check blood sugar three times a day    blood-glucose meter (ACCU-CHEK BRINDA PLUS METER) Misc Use as instructed    insulin glargine (LANTUS) 100 unit/mL injection Inject 20 Units into the skin every evening.    lancets (ACCU-CHEK SOFTCLIX LANCETS) Misc Use to check blood sugar three times a day     Family History     Problem Relation (Age of Onset)    Diabetes Mother, Father        Tobacco Use    Smoking status: Never Smoker    Smokeless tobacco: Never Used   Substance and Sexual Activity    Alcohol use: Never     Frequency: Never    Drug use: Never    Sexual activity: Yes     Partners: Female     Review of Systems   Constitutional: Negative for chills, diaphoresis, fatigue and fever.   HENT: Negative for congestion and sore throat.    Eyes: Negative for discharge and redness.   Respiratory: Negative for cough and shortness of breath.    Cardiovascular: Positive for chest pain. Negative for palpitations and leg swelling.   Gastrointestinal: Negative for abdominal pain, nausea and vomiting.   Genitourinary: Negative for dysuria and flank pain.   Musculoskeletal: Negative for arthralgias and  myalgias.   Skin: Negative for rash.   Neurological: Positive for headaches. Negative for dizziness, syncope, light-headedness and numbness.   Hematological: Does not bruise/bleed easily.   Psychiatric/Behavioral: Negative for agitation and confusion.     Objective:     Vital Signs (Most Recent):  Temp: 98.2 °F (36.8 °C) (12/15/20 0908)  Pulse: 104 (12/15/20 1159)  Resp: 20 (12/15/20 1159)  BP: (!) 149/76 (12/15/20 1159)  SpO2: 98 % (12/15/20 1159) Vital Signs (24h Range):  Temp:  [98.2 °F (36.8 °C)] 98.2 °F (36.8 °C)  Pulse:  [102-108] 104  Resp:  [18-20] 20  SpO2:  [96 %-98 %] 98 %  BP: (133-156)/(76-92) 149/76     Weight: (!) 141 kg (310 lb 13.6 oz)  Body mass index is 44.6 kg/m².    Physical Exam  Vitals signs and nursing note reviewed.   Constitutional:       General: He is not in acute distress.     Appearance: He is obese. He is not diaphoretic.   HENT:      Head: Normocephalic and atraumatic.      Right Ear: External ear normal.      Left Ear: External ear normal.      Nose: Nose normal.   Eyes:      General:         Right eye: No discharge.         Left eye: No discharge.      Pupils: Pupils are equal, round, and reactive to light.   Neck:      Musculoskeletal: Normal range of motion and neck supple.   Cardiovascular:      Rate and Rhythm: Regular rhythm. Tachycardia present.   Pulmonary:      Effort: Pulmonary effort is normal.      Breath sounds: Normal breath sounds.   Abdominal:      General: Bowel sounds are normal.      Palpations: Abdomen is soft. There is no mass.      Tenderness: There is no abdominal tenderness.   Musculoskeletal: Normal range of motion.   Skin:     General: Skin is warm and dry.   Neurological:      Mental Status: He is alert and oriented to person, place, and time.   Psychiatric:         Behavior: Behavior normal.           CRANIAL NERVES     CN III, IV, VI   Pupils are equal, round, and reactive to light.       Significant Labs:   CBC:   Recent Labs   Lab 12/15/20  0940   WBC  6.00   HGB 14.3   HCT 45.6        CMP:   Recent Labs   Lab 12/15/20  0940      K 4.2      CO2 27   *   BUN 18   CREATININE 1.2   CALCIUM 9.5   PROT 7.7   ALBUMIN 3.9   BILITOT 0.3   ALKPHOS 73   AST 12   ALT 27   ANIONGAP 8   EGFRNONAA >60     Cardiac Markers:   Recent Labs   Lab 12/15/20  0940   BNP <10     Troponin:   Recent Labs   Lab 12/15/20  0940   TROPONINI <0.006     Urine Studies:   Recent Labs   Lab 12/15/20  1054   COLORU Yellow   APPEARANCEUA Clear   PHUR 6.0   SPECGRAV 1.025   PROTEINUA Negative   GLUCUA 3+*   KETONESU Trace*   BILIRUBINUA Negative   OCCULTUA Negative   NITRITE Negative   UROBILINOGEN Negative   LEUKOCYTESUR Negative   BACTERIA None     All pertinent labs within the past 24 hours have been reviewed.    Significant Imaging: I have reviewed all pertinent imaging results/findings within the past 24 hours.

## 2020-12-15 NOTE — HPI
50-year-old Male with a PMHx significant for uncontrolled HTN, uncontrolled T2DM, MI, 1 Stent Placement presented to the ED this morning after experiencing CP this morning on the way to his cardiology appointment. Patient states that his CP is felt most in his mid-chest, it is 10/10 in severity without radiation, and he states that he was concerned because it felt similar to how it felt when he had a heart attack. Patient states the only associated symptom is a headache, he denies weakness/numbness, diaphoresis, palpitations, arm/shoulder/jaw pain, N/V, dizziness, abdominal pain, leg pain/cramping. He states that when he rests it will alleviate the CP, and the CP is brought on by a specific amout of exertion typically, but he can not specify the amount. He states that it has been occurring off and on for a while now but could not specify for how long.    Patient does not seem to be taking most of his medications. He could not say what he was on. He states that he was never able to take his insulin because he was not supplied with needles and syringes and also that his glucometer was not working.   ED workup shows: initial ECG revealed no abnormalities when comparing it to a previous one obtained in Sept. 2020. BNP, CK, and Troponins WNL. BP is elevated, glucose is elevated.  He is a full code and his SDM is his niece, Katarina.  He will be kept on OBS for chest pain under the care of hospital medicine.

## 2020-12-15 NOTE — ASSESSMENT & PLAN NOTE
--initial troponin 0.006  --ECG shows no significant abnormalities when compared to one done in Sept. 2020.   --will trend troponins  --BNP and CPK WNL  --cardiology consulted  --nitroglycerine paste  --telemetry monitoring

## 2020-12-15 NOTE — TELEPHONE ENCOUNTER
Patient was asked at his appt. If he was having any chest pain, dizziness or shortness of breath. He stated he was having active chest pain at a level of 10. He was referred to the nearest emergency room for evaluation.

## 2020-12-15 NOTE — SUBJECTIVE & OBJECTIVE
Past Medical History:   Diagnosis Date    Coronary artery disease     Diabetes mellitus, type 2     Hypertension        History reviewed. No pertinent surgical history.    Review of patient's allergies indicates:   Allergen Reactions    Beesix        No current facility-administered medications on file prior to encounter.      Current Outpatient Medications on File Prior to Encounter   Medication Sig    clopidogreL (PLAVIX) 75 mg tablet Take 75 mg by mouth once daily.    JANUVIA 100 mg Tab Take 100 mg by mouth once daily.    lisinopriL (PRINIVIL,ZESTRIL) 20 MG tablet Take 20 mg by mouth once daily.    metFORMIN (GLUCOPHAGE) 1000 MG tablet Take 1,000 mg by mouth 2 (two) times daily.    rosuvastatin (CRESTOR) 40 MG Tab Take 1 tablet (40 mg total) by mouth every evening.    blood sugar diagnostic Strp Use to check blood sugar three times a day    blood-glucose meter (ACCU-CHEK BRINDA PLUS METER) Misc Use as instructed    insulin glargine (LANTUS) 100 unit/mL injection Inject 20 Units into the skin every evening.    lancets (ACCU-CHEK SOFTCLIX LANCETS) Misc Use to check blood sugar three times a day     Family History     Problem Relation (Age of Onset)    Diabetes Mother, Father        Tobacco Use    Smoking status: Never Smoker    Smokeless tobacco: Never Used   Substance and Sexual Activity    Alcohol use: Never     Frequency: Never    Drug use: Never    Sexual activity: Yes     Partners: Female     Review of Systems   Constitution: Positive for malaise/fatigue.   HENT: Negative for hearing loss and hoarse voice.    Eyes: Negative for blurred vision and visual disturbance.   Cardiovascular: Positive for chest pain, dyspnea on exertion, orthopnea and paroxysmal nocturnal dyspnea. Negative for claudication, irregular heartbeat, leg swelling, near-syncope, palpitations and syncope.   Respiratory: Positive for shortness of breath, sleep disturbances due to breathing and snoring. Negative for cough,  hemoptysis and wheezing.    Endocrine: Negative for cold intolerance and heat intolerance.   Hematologic/Lymphatic: Bruises/bleeds easily (on ASA and Plavix).   Skin: Negative for color change, dry skin and nail changes.   Musculoskeletal: Positive for arthritis and back pain. Negative for joint pain and myalgias.   Gastrointestinal: Negative for bloating, abdominal pain, constipation, nausea and vomiting.   Genitourinary: Negative for dysuria, flank pain, hematuria and hesitancy.   Neurological: Positive for weakness. Negative for headaches, light-headedness, loss of balance, numbness and paresthesias.   Psychiatric/Behavioral: Negative for altered mental status.   Allergic/Immunologic: Negative for environmental allergies.     Objective:     Vital Signs (Most Recent):  Temp: 98.3 °F (36.8 °C) (12/15/20 1531)  Pulse: 110 (12/15/20 1531)  Resp: 18 (12/15/20 1531)  BP: 137/79 (12/15/20 1531)  SpO2: 96 % (12/15/20 1531) Vital Signs (24h Range):  Temp:  [98.2 °F (36.8 °C)-98.3 °F (36.8 °C)] 98.3 °F (36.8 °C)  Pulse:  [102-110] 110  Resp:  [18-20] 18  SpO2:  [96 %-98 %] 96 %  BP: (133-156)/(76-92) 137/79     Weight: (!) 140.6 kg (310 lb)  Body mass index is 44.48 kg/m².    SpO2: 96 %  O2 Device (Oxygen Therapy): room air    No intake or output data in the 24 hours ending 12/15/20 1547    Lines/Drains/Airways     Peripheral Intravenous Line                 Peripheral IV - Single Lumen 12/15/20 0940 18 G Left Antecubital less than 1 day                Physical Exam   Constitutional: He is oriented to person, place, and time. He appears well-developed and well-nourished. No distress.   HENT:   Head: Normocephalic and atraumatic.   Eyes: Pupils are equal, round, and reactive to light.   Neck: Normal range of motion and full passive range of motion without pain. Neck supple. No JVD present.   Cardiovascular: Regular rhythm, S1 normal, S2 normal and intact distal pulses. Tachycardia present. PMI is not displaced. Exam reveals  distant heart sounds.   No murmur heard.  Pulses:       Radial pulses are 2+ on the right side and 2+ on the left side.        Dorsalis pedis pulses are 2+ on the right side and 2+ on the left side.   CHEST PAIN FREE   Pulmonary/Chest: Effort normal and breath sounds normal. No accessory muscle usage. No respiratory distress. He has no decreased breath sounds. He has no wheezes. He has no rales.   Abdominal: Soft. Bowel sounds are normal. He exhibits no distension. There is no abdominal tenderness.   Morbidly obese abdomen   Musculoskeletal: Normal range of motion.         General: No edema.      Right ankle: He exhibits no swelling.      Left ankle: He exhibits no swelling.   Neurological: He is alert and oriented to person, place, and time.   Skin: Skin is warm and dry. He is not diaphoretic. No cyanosis. Nails show no clubbing.   Psychiatric: He has a normal mood and affect. His speech is normal and behavior is normal. Judgment and thought content normal. Cognition and memory are normal.   Nursing note and vitals reviewed.      Significant Labs:   BMP:   Recent Labs   Lab 12/15/20  0940 12/15/20  1319   *  --      --    K 4.2  --      --    CO2 27  --    BUN 18  --    CREATININE 1.2  --    CALCIUM 9.5  --    MG  --  1.8   , CBC   Recent Labs   Lab 12/15/20  0940   WBC 6.00   HGB 14.3   HCT 45.6      , Troponin   Recent Labs   Lab 12/15/20  0940 12/15/20  1319   TROPONINI <0.006 <0.006    and All pertinent lab results from the last 24 hours have been reviewed.    Significant Imaging: Echocardiogram:   Transthoracic echo (TTE) complete (Cupid Only):   Results for orders placed or performed during the hospital encounter of 12/15/20   Echo Color Flow Doppler? Yes   Result Value Ref Range    BSA 2.64 m2    Ascending aorta 3.50 cm    STJ 3.54 cm    AV mean gradient 4 mmHg    Ao peak roberth 1.22 m/s    Ao VTI 18.88 cm    IVRT 82.78 msec    IVS 1.75 (A) 0.6 - 1.1 cm    LA size 3.71 cm    Left  Atrium Major Axis 5.15 cm    Left Atrium Minor Axis 3.61 cm    LVIDd 3.77 3.5 - 6.0 cm    LVIDs 2.42 2.1 - 4.0 cm    LVOT diameter 2.26 cm    LVOT peak VTI 16.44 cm    Posterior Wall 1.71 (A) 0.6 - 1.1 cm    MV Peak A Bunny 0.67 m/s    E wave decelartion time 196.61 msec    MV Peak E Bunny 0.70 m/s    RA Major Axis 3.62 cm    Sinus 3.72 cm    TAPSE 1.99 cm    TR Max Bunny 2.25 m/s    TDI LATERAL 0.08 m/s    TDI SEPTAL 0.06 m/s    Ao root annulus 3.90 cm    LV Diastolic Volume 60.97 mL    LV Systolic Volume 20.49 mL    LVOT peak bunny 0.92 m/s    RVOT peak VTI 14.03 cm    RVOT peak bunny 0.76 m/s    PV mean gradient 2 mmHg    LV LATERAL E/E' RATIO 8.75 m/s    LV SEPTAL E/E' RATIO 11.67 m/s    FS 36 %    LV mass 270.46 g    Left Ventricle Relative Wall Thickness 0.91 cm    AV valve area 3.49 cm2    AV Velocity Ratio 0.75     AV index (prosthetic) 0.87     E/A ratio 1.04     Mean e' 0.07 m/s    LVOT area 4.0 cm2    LVOT stroke volume 65.92 cm3    AV peak gradient 6 mmHg    E/E' ratio 10.00 m/s    LV Systolic Volume Index 8.1 mL/m2    LV Diastolic Volume Index 24.25 mL/m2    LV Mass Index 108 g/m2    Triscuspid Valve Regurgitation Peak Gradient 20 mmHg    Right Atrial Pressure (from IVC) 3 mmHg    TV rest pulmonary artery pressure 23 mmHg    Narrative    · The left ventricle is normal in size with normal systolic function.   There is left ventricular concentric remodeling. The estimated ejection   fraction is 60%.  · Normal left ventricular diastolic function.  · Normal right ventricular size with normal right ventricular systolic   function.  · There is mild tricuspid valve regurgitation.  · Normal central venous pressure (3 mmHg).  · The estimated PA systolic pressure is 23 mmHg.  · The aortic root is mildly dilated.          0

## 2020-12-15 NOTE — ASSESSMENT & PLAN NOTE
--pt had MI in 2011 with stent placement in [place]  --Pt has not been taking any medications in at least 1 year.

## 2020-12-16 NOTE — PROGRESS NOTES
[facility]  Cardiology  Progress Note    Patient Name: Osmany Stevens  MRN: [id number]  Admission Date: 12/15/2020  Hospital Length of Stay: 0 days  Code Status: Full Code   Attending Physician: Brandon Cervantes MD   Primary Care Physician: Primary Doctor No  Expected Discharge Date:   Principal Problem:Chest pain    Subjective:   HPI    Osmany Stevens is a 50 year old male who presented to [facility] from Cardiology clinic due to chest pain. He reports that he has been having intermittent chest pain since Stent placement in 2011. He recently moved to [place] from [place], [place]. His current medical conditions include CAD s/p stent placement (2011), HTN, HLP, Uncontrolled DM Type II, SHANNON on CPAP ( noncompliant), Old MI. He reports that he has been having daily exertional chest pain since 2011. He is unable to tell me any of his current medications. He has been noncompliant with Cpap therapy and has not been adherent to any dietary restrictions. ED workup revealed troponin negative x 2, K+ 4.2, Cr 1.2, Rapid Covid 19 negative, glucose 368, H/H 14/45. Echo revealed EF 60%, normal diastolic function, elevated PAS. He was placed in observation under the care of hospital medicine. Cardiology consulted to assist with medical management. Chart reviewed, patient seen and examined. Denies chest pain at rest. He does continues to endorse exertional chest pain for the past 9 years. He denies compliance with dietary restrictions. He is unsure of his current medications and what he is actually taking. Medications adjusted for CAD today. Needs MPI stress test in AM for further ischemic evaluation. Will request medical records [facility]. He does endorse issues with PND and orthopnea over the past week or so. Further recs to follow.         Hospital Course:   12/16/2020-Patient seen and examined in room, feels good cardiac wise today. MPI stress test pending. Labs reviewed, K+ 4.0, Cr 1.3.     Interval  History: no acute issues noted o/n. Remains chest pain free this AM. MPI stress test pending.     Review of Systems   Constitution: Positive for malaise/fatigue.   HENT: Negative for hearing loss and hoarse voice.    Eyes: Negative for blurred vision and visual disturbance.   Cardiovascular: Positive for chest pain, dyspnea on exertion, orthopnea and paroxysmal nocturnal dyspnea. Negative for claudication, irregular heartbeat, leg swelling, near-syncope, palpitations and syncope.   Respiratory: Positive for shortness of breath, sleep disturbances due to breathing and snoring. Negative for cough, hemoptysis and wheezing.    Endocrine: Negative for cold intolerance and heat intolerance.   Hematologic/Lymphatic: Bruises/bleeds easily (on ASA and Plavix).   Skin: Negative for color change, dry skin and nail changes.   Musculoskeletal: Positive for arthritis and back pain. Negative for joint pain and myalgias.   Gastrointestinal: Negative for bloating, abdominal pain, constipation, nausea and vomiting.   Genitourinary: Negative for dysuria, flank pain, hematuria and hesitancy.   Neurological: Positive for weakness. Negative for headaches, light-headedness, loss of balance, numbness and paresthesias.   Psychiatric/Behavioral: Negative for altered mental status.   Allergic/Immunologic: Negative for environmental allergies.     Objective:     Vital Signs (Most Recent):  Temp: 98 °F (36.7 °C) (12/16/20 1057)  Pulse: 95 (12/16/20 1100)  Resp: 16 (12/16/20 1057)  BP: (!) 102/55 (12/16/20 1057)  SpO2: 97 % (12/16/20 1057) Vital Signs (24h Range):  Temp:  [96.2 °F (35.7 °C)-98.4 °F (36.9 °C)] 98 °F (36.7 °C)  Pulse:  [] 95  Resp:  [16-20] 16  SpO2:  [96 %-98 %] 97 %  BP: (102-140)/(55-85) 102/55     Weight: (!) 141.1 kg (311 lb)  Body mass index is 44.62 kg/m².     SpO2: 97 %  O2 Device (Oxygen Therapy): room air      Intake/Output Summary (Last 24 hours) at 12/16/2020 1248  Last data filed at 12/16/2020 0500  Gross per 24  hour   Intake 640 ml   Output --   Net 640 ml       Lines/Drains/Airways     Peripheral Intravenous Line                 Peripheral IV - Single Lumen 12/15/20 0940 18 G Left Antecubital 1 day                Physical Exam   Constitutional: He is oriented to person, place, and time. He appears well-developed and well-nourished. No distress.   HENT:   Head: Normocephalic and atraumatic.   Eyes: Pupils are equal, round, and reactive to light.   Neck: Normal range of motion and full passive range of motion without pain. Neck supple. No JVD present.   Cardiovascular: Regular rhythm, S1 normal, S2 normal and intact distal pulses. Tachycardia present. PMI is not displaced. Exam reveals distant heart sounds.   No murmur heard.  Pulses:       Radial pulses are 2+ on the right side and 2+ on the left side.        Dorsalis pedis pulses are 2+ on the right side and 2+ on the left side.   CHEST PAIN FREE   Pulmonary/Chest: Effort normal and breath sounds normal. No accessory muscle usage. No respiratory distress. He has no decreased breath sounds. He has no wheezes. He has no rales.   Abdominal: Soft. Bowel sounds are normal. He exhibits no distension. There is no abdominal tenderness.   Morbidly obese abdomen   Musculoskeletal: Normal range of motion.         General: No edema.      Right ankle: He exhibits no swelling.      Left ankle: He exhibits no swelling.   Neurological: He is alert and oriented to person, place, and time.   Skin: Skin is warm and dry. He is not diaphoretic. No cyanosis. Nails show no clubbing.   Psychiatric: He has a normal mood and affect. His speech is normal and behavior is normal. Judgment and thought content normal. Cognition and memory are normal.   Nursing note and vitals reviewed.      Significant Labs:   BMP:   Recent Labs   Lab 12/15/20  0940 12/15/20  1319 12/16/20  0621   *  --  174*     --  139   K 4.2  --  4.0     --  102   CO2 27  --  29   BUN 18  --  21*   CREATININE 1.2   --  1.3   CALCIUM 9.5  --  9.4   MG  --  1.8 1.7   , CBC   Recent Labs   Lab 12/15/20  0940 12/16/20  0622   WBC 6.00 10.19   HGB 14.3 13.9*   HCT 45.6 44.1    255   , Troponin   Recent Labs   Lab 12/15/20  1319 12/15/20  1913 12/16/20  0013   TROPONINI <0.006 <0.006 <0.006    and All pertinent lab results from the last 24 hours have been reviewed.    Significant Imaging: Echocardiogram:   Transthoracic echo (TTE) complete (Cupid Only):   Results for orders placed or performed during the hospital encounter of 12/15/20   Echo Color Flow Doppler? Yes   Result Value Ref Range    BSA 2.64 m2    Ascending aorta 3.50 cm    STJ 3.54 cm    AV mean gradient 4 mmHg    Ao peak bunny 1.22 m/s    Ao VTI 18.88 cm    IVRT 82.78 msec    IVS 1.75 (A) 0.6 - 1.1 cm    LA size 3.71 cm    Left Atrium Major Axis 5.15 cm    Left Atrium Minor Axis 3.61 cm    LVIDd 3.77 3.5 - 6.0 cm    LVIDs 2.42 2.1 - 4.0 cm    LVOT diameter 2.26 cm    LVOT peak VTI 16.44 cm    Posterior Wall 1.71 (A) 0.6 - 1.1 cm    MV Peak A Bunny 0.67 m/s    E wave decelartion time 196.61 msec    MV Peak E Bunny 0.70 m/s    RA Major Axis 3.62 cm    Sinus 3.72 cm    TAPSE 1.99 cm    TR Max Bunny 2.25 m/s    TDI LATERAL 0.08 m/s    TDI SEPTAL 0.06 m/s    Ao root annulus 3.90 cm    LV Diastolic Volume 60.97 mL    LV Systolic Volume 20.49 mL    LVOT peak bunny 0.92 m/s    RVOT peak VTI 14.03 cm    RVOT peak bunny 0.76 m/s    PV mean gradient 2 mmHg    LV LATERAL E/E' RATIO 8.75 m/s    LV SEPTAL E/E' RATIO 11.67 m/s    FS 36 %    LV mass 270.46 g    Left Ventricle Relative Wall Thickness 0.91 cm    AV valve area 3.49 cm2    AV Velocity Ratio 0.75     AV index (prosthetic) 0.87     E/A ratio 1.04     Mean e' 0.07 m/s    LVOT area 4.0 cm2    LVOT stroke volume 65.92 cm3    AV peak gradient 6 mmHg    E/E' ratio 10.00 m/s    LV Systolic Volume Index 8.1 mL/m2    LV Diastolic Volume Index 24.25 mL/m2    LV Mass Index 108 g/m2    Triscuspid Valve Regurgitation Peak Gradient 20 mmHg     Right Atrial Pressure (from IVC) 3 mmHg    TV rest pulmonary artery pressure 23 mmHg    Narrative    · The left ventricle is normal in size with normal systolic function.   There is left ventricular concentric remodeling. The estimated ejection   fraction is 60%.  · Normal left ventricular diastolic function.  · Normal right ventricular size with normal right ventricular systolic   function.  · There is mild tricuspid valve regurgitation.  · Normal central venous pressure (3 mmHg).  · The estimated PA systolic pressure is 23 mmHg.  · The aortic root is mildly dilated.        Assessment and Plan:       * Chest pain  Reports exertional chest pain for the past 9 years  MPI stress test in AM  Medical therapy adjusted for CAD today  Continue ASA, Statin, Plavix, BB, ACEi, Imdur  Dash diet, 2 gm sodium restriction  1500 ml fluid restriction  Keep NPO after MN  Further recs to follow.     12/16  -Continue ASA, Statin, Plavix, BB, ACEi, Imdur  -MPI stress pending    Essential hypertension  Medical therapy adjusted for BP control  Continue BB, ACEi    SHANNON on CPAP  Reports noncompliance with nightly cpap therapy  Encouraged compliance    Morbidly obese  Encouraged weight loss after discharge    History of MI (myocardial infarction)  Medical therapy adjusted for OMT for CAD today    Type 2 diabetes mellitus, with long-term current use of insulin  Needs aggressive mgmt of diabetes as OP  Mgmt per primary team    Mixed hyperlipidemia  Continue statin        VTE Risk Mitigation (From admission, onward)         Ordered     IP VTE HIGH RISK PATIENT  Once      12/15/20 1241     Place sequential compression device  Until discontinued      12/15/20 1241                ALYSHA CoatesC  Cardiology  [facility]

## 2020-12-16 NOTE — CONSULTS
Consulted to this 50 year old male patient for diabetes education. PMHx significant for uncontrolled HTN, uncontrolled T2DM, MI, 1 Stent Placement. Patient reports moving here within the year from [place], [place]. Lives with niece but states he has a wife in [place]. Patient is AAOx3 however a very poor historian and continues to change his responses to the same questions. He doesn't know what home meds he was on, does not know when he was diagnosed with DMII (doesn't know whether it was 1 year ago or 10 years ago). Patient reports he hasn't been able to obtain meds due to no insurance however he does have insurance on file. He reports he has a glucometer but it doesn't work. Patient has poor knowledge and comprehension of diabetes disease process. He reports his diet consists of pizza and gatorade because that's all his niece eats and that he needs to get his food stamps in order to obtain other meal options. Asked patient if his niece could bring his glucometer so we could possible troubleshoot it. Hemoglobin A1C 10.3. Discussed diet, target CBG, target A1C, exercise. Will revisit tomorrow as patient appears anxious and having chest pain. Will supply patient with meter if unable to troubleshoot his current meter. He will need PCP established and would benefit from outpatient diabetes management.

## 2020-12-16 NOTE — ASSESSMENT & PLAN NOTE
Reports exertional chest pain for the past 9 years  MPI stress test in AM  Medical therapy adjusted for CAD today  Continue ASA, Statin, Plavix, BB, ACEi, Imdur  Dash diet, 2 gm sodium restriction  1500 ml fluid restriction  Keep NPO after MN  Further recs to follow.     12/16  -Continue ASA, Statin, Plavix, BB, ACEi, Imdur  -MPI stress pending

## 2020-12-16 NOTE — PLAN OF CARE
Pt AAOx4. VSS. Pt remained free of falls this shift. No complaints of pain or discomfort. Medications administered as ordered. Monitored pt blood glucose. Pt is normal sinus on monitor. Hourly rounding completed. Pt instructed to call for assistance. POC reviewed. Pt verbalized understanding.

## 2020-12-16 NOTE — SUBJECTIVE & OBJECTIVE
Interval History: Cardiology plans on NM stress test today. Denies CP at present.    Review of Systems   Constitutional: Negative for activity change, appetite change, chills, diaphoresis, fatigue and fever.   HENT: Negative for congestion, dental problem, ear pain, hearing loss, mouth sores, nosebleeds, sinus pressure, sore throat, tinnitus and trouble swallowing.    Eyes: Negative for pain, discharge, redness and visual disturbance.   Respiratory: Negative for cough, choking, chest tightness, shortness of breath and wheezing.    Cardiovascular: Negative for chest pain, palpitations and leg swelling.   Gastrointestinal: Negative for abdominal distention, abdominal pain, anal bleeding, blood in stool, constipation, diarrhea, nausea and vomiting.   Endocrine: Negative for cold intolerance, heat intolerance, polydipsia, polyphagia and polyuria.   Genitourinary: Negative for decreased urine volume, difficulty urinating, dysuria, flank pain, frequency, hematuria and urgency.   Musculoskeletal: Negative for arthralgias, back pain, gait problem, myalgias, neck pain and neck stiffness.   Skin: Negative for color change, rash and wound.   Allergic/Immunologic: Negative.    Neurological: Negative for dizziness, tremors, seizures, syncope, speech difficulty, weakness, light-headedness, numbness and headaches.   Hematological: Negative.    Psychiatric/Behavioral: Negative for agitation, behavioral problems, confusion and sleep disturbance. The patient is not nervous/anxious.    All other systems reviewed and are negative.    Objective:     Vital Signs (Most Recent):  Temp: 97.9 °F (36.6 °C) (12/16/20 0727)  Pulse: 94 (12/16/20 0727)  Resp: 18 (12/16/20 0727)  BP: 135/77 (12/16/20 0727)  SpO2: 97 % (12/16/20 0727) Vital Signs (24h Range):  Temp:  [96.2 °F (35.7 °C)-98.4 °F (36.9 °C)] 97.9 °F (36.6 °C)  Pulse:  [] 94  Resp:  [18-20] 18  SpO2:  [96 %-98 %] 97 %  BP: (127-156)/(57-92) 135/77     Weight: (!) 141.5 kg (311 lb  15.2 oz)  Body mass index is 44.76 kg/m².    Intake/Output Summary (Last 24 hours) at 12/16/2020 0756  Last data filed at 12/16/2020 0500  Gross per 24 hour   Intake 640 ml   Output --   Net 640 ml      Physical Exam    Significant Labs:   Recent Lab Results       12/16/20  0622   12/16/20  0621   12/16/20  0013   12/15/20  2230   12/15/20  1913        Anion Gap   8           Baso # 0.04             Basophil % 0.4             BUN   21           Calcium   9.4           Chloride   102           CO2   29           Creatinine   1.3           Differential Method Automated             eGFR if    >60           eGFR if non    >60  Comment:  Calculation used to obtain the estimated glomerular filtration  rate (eGFR) is the CKD-EPI equation.              Eos # 0.3             Eosinophil % 2.6             Glucose   174           Gran # (ANC) 6.2             Gran % 61.3             Hematocrit 44.1             Hemoglobin 13.9             Immature Grans (Abs) 0.03  Comment:  Mild elevation in immature granulocytes is non specific and   can be seen in a variety of conditions including stress response,   acute inflammation, trauma and pregnancy. Correlation with other   laboratory and clinical findings is essential.               Immature Granulocytes 0.3             Lymph # 2.5             Lymph % 24.7             Magnesium   1.7           MCH 29.0             MCHC 31.5             MCV 92             Mono # 1.1             Mono % 10.7             MPV 11.9             nRBC 0             Phosphorus   4.2           Platelets 255             POCT Glucose       202       Potassium   4.0           RBC 4.80             RDW 12.4             Sodium   139           Troponin I     <0.006  Comment:  The reference interval for Troponin I represents the 99th percentile   cutoff   for our facility and is consistent with 3rd generation assay   performance.     <0.006  Comment:  The reference interval for Troponin I  represents the 99th percentile   cutoff   for our facility and is consistent with 3rd generation assay   performance.       WBC 10.19                              12/15/20  1646        Anion Gap       Baso #       Basophil %       BUN       Calcium       Chloride       CO2       Creatinine       Differential Method       eGFR if        eGFR if non        Eos #       Eosinophil %       Glucose       Gran # (ANC)       Gran %       Hematocrit       Hemoglobin       Immature Grans (Abs)       Immature Granulocytes       Lymph #       Lymph %       Magnesium       MCH       MCHC       MCV       Mono #       Mono %       MPV       nRBC       Phosphorus       Platelets       POCT Glucose 229     Potassium       RBC       RDW       Sodium       Troponin I       WBC           All pertinent labs within the past 24 hours have been reviewed.    Significant Imaging: I have reviewed all pertinent imaging results/findings within the past 24 hours.

## 2020-12-16 NOTE — ASSESSMENT & PLAN NOTE
--initial troponin 0.006  --ECG shows no significant abnormalities when compared to one done in Sept. 2020.   --troponin's WNL X 4  --BNP and CPK WNL  --cardiology following  --NM stress test today & tomorrow  --nitroglycerine paste  --telemetry monitoring

## 2020-12-16 NOTE — HOSPITAL COURSE
12/16/2020-Patient seen and examined in room, feels good cardiac wise today. MPI stress test pending. Labs reviewed, K+ 4.0, Cr 1.3.     12/17/2020-Patient seen and examined in room, feels good today. No further chest pain since admit. Stress test negative for ischemia, + fixed defect. Ok to discharge home from cardiology standpoint. Needs OP Cards follow up arranged.

## 2020-12-16 NOTE — SUBJECTIVE & OBJECTIVE
Interval History: no acute issues noted o/n. Remains chest pain free this AM. MPI stress test pending.     Review of Systems   Constitution: Positive for malaise/fatigue.   HENT: Negative for hearing loss and hoarse voice.    Eyes: Negative for blurred vision and visual disturbance.   Cardiovascular: Positive for chest pain, dyspnea on exertion, orthopnea and paroxysmal nocturnal dyspnea. Negative for claudication, irregular heartbeat, leg swelling, near-syncope, palpitations and syncope.   Respiratory: Positive for shortness of breath, sleep disturbances due to breathing and snoring. Negative for cough, hemoptysis and wheezing.    Endocrine: Negative for cold intolerance and heat intolerance.   Hematologic/Lymphatic: Bruises/bleeds easily (on ASA and Plavix).   Skin: Negative for color change, dry skin and nail changes.   Musculoskeletal: Positive for arthritis and back pain. Negative for joint pain and myalgias.   Gastrointestinal: Negative for bloating, abdominal pain, constipation, nausea and vomiting.   Genitourinary: Negative for dysuria, flank pain, hematuria and hesitancy.   Neurological: Positive for weakness. Negative for headaches, light-headedness, loss of balance, numbness and paresthesias.   Psychiatric/Behavioral: Negative for altered mental status.   Allergic/Immunologic: Negative for environmental allergies.     Objective:     Vital Signs (Most Recent):  Temp: 98 °F (36.7 °C) (12/16/20 1057)  Pulse: 95 (12/16/20 1100)  Resp: 16 (12/16/20 1057)  BP: (!) 102/55 (12/16/20 1057)  SpO2: 97 % (12/16/20 1057) Vital Signs (24h Range):  Temp:  [96.2 °F (35.7 °C)-98.4 °F (36.9 °C)] 98 °F (36.7 °C)  Pulse:  [] 95  Resp:  [16-20] 16  SpO2:  [96 %-98 %] 97 %  BP: (102-140)/(55-85) 102/55     Weight: (!) 141.1 kg (311 lb)  Body mass index is 44.62 kg/m².     SpO2: 97 %  O2 Device (Oxygen Therapy): room air      Intake/Output Summary (Last 24 hours) at 12/16/2020 1248  Last data filed at 12/16/2020  0500  Gross per 24 hour   Intake 640 ml   Output --   Net 640 ml       Lines/Drains/Airways     Peripheral Intravenous Line                 Peripheral IV - Single Lumen 12/15/20 0940 18 G Left Antecubital 1 day                Physical Exam   Constitutional: He is oriented to person, place, and time. He appears well-developed and well-nourished. No distress.   HENT:   Head: Normocephalic and atraumatic.   Eyes: Pupils are equal, round, and reactive to light.   Neck: Normal range of motion and full passive range of motion without pain. Neck supple. No JVD present.   Cardiovascular: Regular rhythm, S1 normal, S2 normal and intact distal pulses. Tachycardia present. PMI is not displaced. Exam reveals distant heart sounds.   No murmur heard.  Pulses:       Radial pulses are 2+ on the right side and 2+ on the left side.        Dorsalis pedis pulses are 2+ on the right side and 2+ on the left side.   CHEST PAIN FREE   Pulmonary/Chest: Effort normal and breath sounds normal. No accessory muscle usage. No respiratory distress. He has no decreased breath sounds. He has no wheezes. He has no rales.   Abdominal: Soft. Bowel sounds are normal. He exhibits no distension. There is no abdominal tenderness.   Morbidly obese abdomen   Musculoskeletal: Normal range of motion.         General: No edema.      Right ankle: He exhibits no swelling.      Left ankle: He exhibits no swelling.   Neurological: He is alert and oriented to person, place, and time.   Skin: Skin is warm and dry. He is not diaphoretic. No cyanosis. Nails show no clubbing.   Psychiatric: He has a normal mood and affect. His speech is normal and behavior is normal. Judgment and thought content normal. Cognition and memory are normal.   Nursing note and vitals reviewed.      Significant Labs:   BMP:   Recent Labs   Lab 12/15/20  0940 12/15/20  1319 12/16/20  0621   *  --  174*     --  139   K 4.2  --  4.0     --  102   CO2 27  --  29   BUN 18  --   21*   CREATININE 1.2  --  1.3   CALCIUM 9.5  --  9.4   MG  --  1.8 1.7   , CBC   Recent Labs   Lab 12/15/20  0940 12/16/20  0622   WBC 6.00 10.19   HGB 14.3 13.9*   HCT 45.6 44.1    255   , Troponin   Recent Labs   Lab 12/15/20  1319 12/15/20  1913 12/16/20  0013   TROPONINI <0.006 <0.006 <0.006    and All pertinent lab results from the last 24 hours have been reviewed.    Significant Imaging: Echocardiogram:   Transthoracic echo (TTE) complete (Cupid Only):   Results for orders placed or performed during the hospital encounter of 12/15/20   Echo Color Flow Doppler? Yes   Result Value Ref Range    BSA 2.64 m2    Ascending aorta 3.50 cm    STJ 3.54 cm    AV mean gradient 4 mmHg    Ao peak bunny 1.22 m/s    Ao VTI 18.88 cm    IVRT 82.78 msec    IVS 1.75 (A) 0.6 - 1.1 cm    LA size 3.71 cm    Left Atrium Major Axis 5.15 cm    Left Atrium Minor Axis 3.61 cm    LVIDd 3.77 3.5 - 6.0 cm    LVIDs 2.42 2.1 - 4.0 cm    LVOT diameter 2.26 cm    LVOT peak VTI 16.44 cm    Posterior Wall 1.71 (A) 0.6 - 1.1 cm    MV Peak A Bunny 0.67 m/s    E wave decelartion time 196.61 msec    MV Peak E Bunny 0.70 m/s    RA Major Axis 3.62 cm    Sinus 3.72 cm    TAPSE 1.99 cm    TR Max Bunny 2.25 m/s    TDI LATERAL 0.08 m/s    TDI SEPTAL 0.06 m/s    Ao root annulus 3.90 cm    LV Diastolic Volume 60.97 mL    LV Systolic Volume 20.49 mL    LVOT peak bunny 0.92 m/s    RVOT peak VTI 14.03 cm    RVOT peak bunny 0.76 m/s    PV mean gradient 2 mmHg    LV LATERAL E/E' RATIO 8.75 m/s    LV SEPTAL E/E' RATIO 11.67 m/s    FS 36 %    LV mass 270.46 g    Left Ventricle Relative Wall Thickness 0.91 cm    AV valve area 3.49 cm2    AV Velocity Ratio 0.75     AV index (prosthetic) 0.87     E/A ratio 1.04     Mean e' 0.07 m/s    LVOT area 4.0 cm2    LVOT stroke volume 65.92 cm3    AV peak gradient 6 mmHg    E/E' ratio 10.00 m/s    LV Systolic Volume Index 8.1 mL/m2    LV Diastolic Volume Index 24.25 mL/m2    LV Mass Index 108 g/m2    Triscuspid Valve Regurgitation  Peak Gradient 20 mmHg    Right Atrial Pressure (from IVC) 3 mmHg    TV rest pulmonary artery pressure 23 mmHg    Narrative    · The left ventricle is normal in size with normal systolic function.   There is left ventricular concentric remodeling. The estimated ejection   fraction is 60%.  · Normal left ventricular diastolic function.  · Normal right ventricular size with normal right ventricular systolic   function.  · There is mild tricuspid valve regurgitation.  · Normal central venous pressure (3 mmHg).  · The estimated PA systolic pressure is 23 mmHg.  · The aortic root is mildly dilated.

## 2020-12-16 NOTE — PROGRESS NOTES
[facility] Hospital Medicine  Progress Note    Patient Name: Osmany Stveens  MRN: [id number]  Patient Class: OP- Observation   Admission Date: 12/15/2020  Length of Stay: 0 days  Attending Physician: Brandon Cervantes MD  Primary Care Provider: Primary Doctor No        Subjective:     Principal Problem:Chest pain        HPI:  50-year-old Male with a PMHx significant for uncontrolled HTN, uncontrolled T2DM, MI, 1 Stent Placement presented to the ED this morning after experiencing CP this morning on the way to his cardiology appointment. Patient states that his CP is felt most in his mid-chest, it is 10/10 in severity without radiation, and he states that he was concerned because it felt similar to how it felt when he had a heart attack. Patient states the only associated symptom is a headache, he denies weakness/numbness, diaphoresis, palpitations, arm/shoulder/jaw pain, N/V, dizziness, abdominal pain, leg pain/cramping. He states that when he rests it will alleviate the CP, and the CP is brought on by a specific amout of exertion typically, but he can not specify the amount. He states that it has been occurring off and on for a while now but could not specify for how long.    Patient does not seem to be taking most of his medications. He could not say what he was on. He states that he was never able to take his insulin because he was not supplied with needles and syringes and also that his glucometer was not working.   ED workup shows: initial ECG revealed no abnormalities when comparing it to a previous one obtained in Sept. 2020. BNP, CK, and Troponins WNL. BP is elevated, glucose is elevated.  He is a full code and his SDM is his niece, Katarina.  He will be kept on OBS for chest pain under the care of hospital medicine.       Overview/Hospital Course:  Patient was kept on OBS for chest pain under the care of hospital medicine. Cardiology was consulted.    Interval History: Cardiology plans on NM  stress test today. Denies CP at present.    Review of Systems   Constitutional: Negative for activity change, appetite change, chills, diaphoresis, fatigue and fever.   HENT: Negative for congestion, dental problem, ear pain, hearing loss, mouth sores, nosebleeds, sinus pressure, sore throat, tinnitus and trouble swallowing.    Eyes: Negative for pain, discharge, redness and visual disturbance.   Respiratory: Negative for cough, choking, chest tightness, shortness of breath and wheezing.    Cardiovascular: Negative for chest pain, palpitations and leg swelling.   Gastrointestinal: Negative for abdominal distention, abdominal pain, anal bleeding, blood in stool, constipation, diarrhea, nausea and vomiting.   Endocrine: Negative for cold intolerance, heat intolerance, polydipsia, polyphagia and polyuria.   Genitourinary: Negative for decreased urine volume, difficulty urinating, dysuria, flank pain, frequency, hematuria and urgency.   Musculoskeletal: Negative for arthralgias, back pain, gait problem, myalgias, neck pain and neck stiffness.   Skin: Negative for color change, rash and wound.   Allergic/Immunologic: Negative.    Neurological: Negative for dizziness, tremors, seizures, syncope, speech difficulty, weakness, light-headedness, numbness and headaches.   Hematological: Negative.    Psychiatric/Behavioral: Negative for agitation, behavioral problems, confusion and sleep disturbance. The patient is not nervous/anxious.    All other systems reviewed and are negative.    Objective:     Vital Signs (Most Recent):  Temp: 97.9 °F (36.6 °C) (12/16/20 0727)  Pulse: 94 (12/16/20 0727)  Resp: 18 (12/16/20 0727)  BP: 135/77 (12/16/20 0727)  SpO2: 97 % (12/16/20 0727) Vital Signs (24h Range):  Temp:  [96.2 °F (35.7 °C)-98.4 °F (36.9 °C)] 97.9 °F (36.6 °C)  Pulse:  [] 94  Resp:  [18-20] 18  SpO2:  [96 %-98 %] 97 %  BP: (127-156)/(57-92) 135/77     Weight: (!) 141.5 kg (311 lb 15.2 oz)  Body mass index is 44.76  kg/m².    Intake/Output Summary (Last 24 hours) at 12/16/2020 0756  Last data filed at 12/16/2020 0500  Gross per 24 hour   Intake 640 ml   Output --   Net 640 ml      Physical Exam    Significant Labs:   Recent Lab Results       12/16/20  0622   12/16/20  0621   12/16/20  0013   12/15/20  2230   12/15/20  1913        Anion Gap   8           Baso # 0.04             Basophil % 0.4             BUN   21           Calcium   9.4           Chloride   102           CO2   29           Creatinine   1.3           Differential Method Automated             eGFR if    >60           eGFR if non    >60  Comment:  Calculation used to obtain the estimated glomerular filtration  rate (eGFR) is the CKD-EPI equation.              Eos # 0.3             Eosinophil % 2.6             Glucose   174           Gran # (ANC) 6.2             Gran % 61.3             Hematocrit 44.1             Hemoglobin 13.9             Immature Grans (Abs) 0.03  Comment:  Mild elevation in immature granulocytes is non specific and   can be seen in a variety of conditions including stress response,   acute inflammation, trauma and pregnancy. Correlation with other   laboratory and clinical findings is essential.               Immature Granulocytes 0.3             Lymph # 2.5             Lymph % 24.7             Magnesium   1.7           MCH 29.0             MCHC 31.5             MCV 92             Mono # 1.1             Mono % 10.7             MPV 11.9             nRBC 0             Phosphorus   4.2           Platelets 255             POCT Glucose       202       Potassium   4.0           RBC 4.80             RDW 12.4             Sodium   139           Troponin I     <0.006  Comment:  The reference interval for Troponin I represents the 99th percentile   cutoff   for our facility and is consistent with 3rd generation assay   performance.     <0.006  Comment:  The reference interval for Troponin I represents the 99th percentile    cutoff   for our facility and is consistent with 3rd generation assay   performance.       WBC 10.19                              12/15/20  1646        Anion Gap       Baso #       Basophil %       BUN       Calcium       Chloride       CO2       Creatinine       Differential Method       eGFR if        eGFR if non        Eos #       Eosinophil %       Glucose       Gran # (ANC)       Gran %       Hematocrit       Hemoglobin       Immature Grans (Abs)       Immature Granulocytes       Lymph #       Lymph %       Magnesium       MCH       MCHC       MCV       Mono #       Mono %       MPV       nRBC       Phosphorus       Platelets       POCT Glucose 229     Potassium       RBC       RDW       Sodium       Troponin I       WBC           All pertinent labs within the past 24 hours have been reviewed.    Significant Imaging: I have reviewed all pertinent imaging results/findings within the past 24 hours.      Assessment/Plan:      * Chest pain  --initial troponin 0.006  --ECG shows no significant abnormalities when compared to one done in Sept. 2020.   --troponin's WNL X 4  --BNP and CPK WNL  --cardiology following  --NM stress test today & tomorrow  --nitroglycerine paste  --telemetry monitoring    Type 2 diabetes mellitus, with long-term current use of insulin  --accuchecks and SSI  --Levemir q HS  --HA1C 10.3  --diabetic diet  --diabetes educator following    Essential hypertension  --continue lisinopril, metoprolol, isosorbide, furosemide      SHANNON on CPAP  --cpap q HS      Morbidly obese  --counseled on weight loss      History of MI (myocardial infarction)  --pt had MI in 2011 with stent placement in [place]  --Pt has not been taking any medications in at least 1 year.      Mixed hyperlipidemia  --crestor  --cardiac diet        VTE Risk Mitigation (From admission, onward)         Ordered     IP VTE HIGH RISK PATIENT  Once      12/15/20 1241     Place sequential compression device   Until discontinued      12/15/20 1241                Discharge Planning   NILSON:      Code Status: Full Code   Is the patient medically ready for discharge?:     Reason for patient still in hospital (select all that apply): Patient trending condition, Imaging and Consult recommendations  Discharge Plan A: Home with family                  VERONA Kapoor-KIT  Department of Hospital Medicine   [facility]

## 2020-12-17 PROBLEM — R07.9 CHEST PAIN: Status: RESOLVED | Noted: 2020-01-01 | Resolved: 2020-01-01

## 2020-12-17 NOTE — PROGRESS NOTES
[facility]  Cardiology  Progress Note    Patient Name: Osmany Stevens  MRN: [id number]  Admission Date: 12/15/2020  Hospital Length of Stay: 0 days  Code Status: Full Code   Attending Physician: No att. providers found   Primary Care Physician: Primary Doctor No  Expected Discharge Date: 12/17/2020  Principal Problem:Chest pain    Subjective:   HPI    Osmany Stevens is a 50 year old male who presented to [facility] from Cardiology clinic due to chest pain. He reports that he has been having intermittent chest pain since Stent placement in 2011. He recently moved to [place] from [place], [place]. His current medical conditions include CAD s/p stent placement (2011), HTN, HLP, Uncontrolled DM Type II, SHANNON on CPAP ( noncompliant), Old MI. He reports that he has been having daily exertional chest pain since 2011. He is unable to tell me any of his current medications. He has been noncompliant with Cpap therapy and has not been adherent to any dietary restrictions. ED workup revealed troponin negative x 2, K+ 4.2, Cr 1.2, Rapid Covid 19 negative, glucose 368, H/H 14/45. Echo revealed EF 60%, normal diastolic function, elevated PAS. He was placed in observation under the care of hospital medicine. Cardiology consulted to assist with medical management. Chart reviewed, patient seen and examined. Denies chest pain at rest. He does continues to endorse exertional chest pain for the past 9 years. He denies compliance with dietary restrictions. He is unsure of his current medications and what he is actually taking. Medications adjusted for CAD today. Needs MPI stress test in AM for further ischemic evaluation. Will request medical records from [facility]. He does endorse issues with PND and orthopnea over the past week or so. Further recs to follow.         Hospital Course:   12/16/2020-Patient seen and examined in room, feels good cardiac wise today. MPI stress test pending. Labs reviewed, K+ 4.0, Cr 1.3.      12/17/2020-Patient seen and examined in room, feels good today. No further chest pain since admit. Stress test negative for ischemia, + fixed defect. Ok to discharge home from cardiology standpoint. Needs OP Cards follow up arranged.     Interval History: no acute issues noted o/n. Ok to discharge home from cardiology standpoint. Will need OP Cards follow up arranged.     Review of Systems   Constitution: Positive for malaise/fatigue.   HENT: Negative for hearing loss and hoarse voice.    Eyes: Negative for blurred vision and visual disturbance.   Cardiovascular: Positive for chest pain, dyspnea on exertion, orthopnea and paroxysmal nocturnal dyspnea. Negative for claudication, irregular heartbeat, leg swelling, near-syncope, palpitations and syncope.   Respiratory: Positive for shortness of breath, sleep disturbances due to breathing and snoring. Negative for cough, hemoptysis and wheezing.    Endocrine: Negative for cold intolerance and heat intolerance.   Hematologic/Lymphatic: Bruises/bleeds easily (on ASA and Plavix).   Skin: Negative for color change, dry skin and nail changes.   Musculoskeletal: Positive for arthritis and back pain. Negative for joint pain and myalgias.   Gastrointestinal: Negative for bloating, abdominal pain, constipation, nausea and vomiting.   Genitourinary: Negative for dysuria, flank pain, hematuria and hesitancy.   Neurological: Positive for weakness. Negative for headaches, light-headedness, loss of balance, numbness and paresthesias.   Psychiatric/Behavioral: Negative for altered mental status.   Allergic/Immunologic: Negative for environmental allergies.     Objective:     Vital Signs (Most Recent):  Temp: 97.4 °F (36.3 °C) (12/17/20 0750)  Pulse: 97 (12/17/20 1141)  Resp: 17 (12/17/20 0750)  BP: 139/80 (12/17/20 0750)  SpO2: 98 % (12/17/20 0750) Vital Signs (24h Range):  Temp:  [97.4 °F (36.3 °C)-98.9 °F (37.2 °C)] 97.4 °F (36.3 °C)  Pulse:  [] 97  Resp:  [17-22]  17  SpO2:  [97 %-100 %] 98 %  BP: (127-150)/(57-81) 139/80     Weight: (!) 141.5 kg (311 lb 15.2 oz)  Body mass index is 44.76 kg/m².     SpO2: 98 %  O2 Device (Oxygen Therapy): CPAP      Intake/Output Summary (Last 24 hours) at 12/17/2020 1414  Last data filed at 12/17/2020 0500  Gross per 24 hour   Intake 1180 ml   Output --   Net 1180 ml       Lines/Drains/Airways     None                 Physical Exam   Constitutional: He is oriented to person, place, and time. He appears well-developed and well-nourished. No distress.   HENT:   Head: Normocephalic and atraumatic.   Eyes: Pupils are equal, round, and reactive to light.   Neck: Normal range of motion and full passive range of motion without pain. Neck supple. No JVD present.   Cardiovascular: Regular rhythm, S1 normal, S2 normal and intact distal pulses. Tachycardia present. PMI is not displaced. Exam reveals distant heart sounds.   No murmur heard.  Pulses:       Radial pulses are 2+ on the right side and 2+ on the left side.        Dorsalis pedis pulses are 2+ on the right side and 2+ on the left side.   CHEST PAIN FREE   Pulmonary/Chest: Effort normal and breath sounds normal. No accessory muscle usage. No respiratory distress. He has no decreased breath sounds. He has no wheezes. He has no rales.   Abdominal: Soft. Bowel sounds are normal. He exhibits no distension. There is no abdominal tenderness.   Morbidly obese abdomen   Musculoskeletal: Normal range of motion.         General: No edema.      Right ankle: He exhibits no swelling.      Left ankle: He exhibits no swelling.   Neurological: He is alert and oriented to person, place, and time.   Skin: Skin is warm and dry. He is not diaphoretic. No cyanosis. Nails show no clubbing.   Psychiatric: He has a normal mood and affect. His speech is normal and behavior is normal. Judgment and thought content normal. Cognition and memory are normal.   Nursing note and vitals reviewed.      Significant Labs:   BMP:    Recent Labs   Lab 12/16/20  0621 12/17/20  0723   * 172*    137   K 4.0 3.6    101   CO2 29 27   BUN 21* 26*   CREATININE 1.3 1.2   CALCIUM 9.4 9.5   MG 1.7 1.9   , CBC   Recent Labs   Lab 12/16/20  0622 12/17/20  0723   WBC 10.19 7.15   HGB 13.9* 14.1   HCT 44.1 45.0    253    and All pertinent lab results from the last 24 hours have been reviewed.    Significant Imaging: Echocardiogram:   Transthoracic echo (TTE) complete (Cupid Only):   Results for orders placed or performed during the hospital encounter of 12/15/20   Echo Color Flow Doppler? Yes   Result Value Ref Range    BSA 2.64 m2    Ascending aorta 3.50 cm    STJ 3.54 cm    AV mean gradient 4 mmHg    Ao peak bunny 1.22 m/s    Ao VTI 18.88 cm    IVRT 82.78 msec    IVS 1.75 (A) 0.6 - 1.1 cm    LA size 3.71 cm    Left Atrium Major Axis 5.15 cm    Left Atrium Minor Axis 3.61 cm    LVIDd 3.77 3.5 - 6.0 cm    LVIDs 2.42 2.1 - 4.0 cm    LVOT diameter 2.26 cm    LVOT peak VTI 16.44 cm    Posterior Wall 1.71 (A) 0.6 - 1.1 cm    MV Peak A Bunny 0.67 m/s    E wave decelartion time 196.61 msec    MV Peak E Bunny 0.70 m/s    RA Major Axis 3.62 cm    Sinus 3.72 cm    TAPSE 1.99 cm    TR Max Bunny 2.25 m/s    TDI LATERAL 0.08 m/s    TDI SEPTAL 0.06 m/s    Ao root annulus 3.90 cm    LV Diastolic Volume 60.97 mL    LV Systolic Volume 20.49 mL    LVOT peak bunny 0.92 m/s    RVOT peak VTI 14.03 cm    RVOT peak bunny 0.76 m/s    PV mean gradient 2 mmHg    LV LATERAL E/E' RATIO 8.75 m/s    LV SEPTAL E/E' RATIO 11.67 m/s    FS 36 %    LV mass 270.46 g    Left Ventricle Relative Wall Thickness 0.91 cm    AV valve area 3.49 cm2    AV Velocity Ratio 0.75     AV index (prosthetic) 0.87     E/A ratio 1.04     Mean e' 0.07 m/s    LVOT area 4.0 cm2    LVOT stroke volume 65.92 cm3    AV peak gradient 6 mmHg    E/E' ratio 10.00 m/s    LV Systolic Volume Index 8.1 mL/m2    LV Diastolic Volume Index 24.25 mL/m2    LV Mass Index 108 g/m2    Triscuspid Valve Regurgitation  Peak Gradient 20 mmHg    Right Atrial Pressure (from IVC) 3 mmHg    TV rest pulmonary artery pressure 23 mmHg    Narrative    · The left ventricle is normal in size with normal systolic function.   There is left ventricular concentric remodeling. The estimated ejection   fraction is 60%.  · Normal left ventricular diastolic function.  · Normal right ventricular size with normal right ventricular systolic   function.  · There is mild tricuspid valve regurgitation.  · Normal central venous pressure (3 mmHg).  · The estimated PA systolic pressure is 23 mmHg.  · The aortic root is mildly dilated.        Assessment and Plan:       Essential hypertension  Medical therapy adjusted for BP control  Continue BB, ACEi    SHANNON on CPAP  Reports noncompliance with nightly cpap therapy  Encouraged compliance    Morbidly obese  Encouraged weight loss after discharge    History of MI (myocardial infarction)  Medical therapy adjusted for OMT for CAD today    Type 2 diabetes mellitus, with long-term current use of insulin  Needs aggressive mgmt of diabetes as OP  Mgmt per primary team    Mixed hyperlipidemia  Continue statin        VTE Risk Mitigation (From admission, onward)         Ordered     IP VTE HIGH RISK PATIENT  Once      12/15/20 1241     Place sequential compression device  Until discontinued      12/15/20 1241                Ondina Malik, ARLINP-C  Cardiology  [facility]

## 2020-12-17 NOTE — SUBJECTIVE & OBJECTIVE
Interval History: no acute issues noted o/n. Ok to discharge home from cardiology standpoint. Will need OP Cards follow up arranged.     Review of Systems   Constitution: Positive for malaise/fatigue.   HENT: Negative for hearing loss and hoarse voice.    Eyes: Negative for blurred vision and visual disturbance.   Cardiovascular: Positive for chest pain, dyspnea on exertion, orthopnea and paroxysmal nocturnal dyspnea. Negative for claudication, irregular heartbeat, leg swelling, near-syncope, palpitations and syncope.   Respiratory: Positive for shortness of breath, sleep disturbances due to breathing and snoring. Negative for cough, hemoptysis and wheezing.    Endocrine: Negative for cold intolerance and heat intolerance.   Hematologic/Lymphatic: Bruises/bleeds easily (on ASA and Plavix).   Skin: Negative for color change, dry skin and nail changes.   Musculoskeletal: Positive for arthritis and back pain. Negative for joint pain and myalgias.   Gastrointestinal: Negative for bloating, abdominal pain, constipation, nausea and vomiting.   Genitourinary: Negative for dysuria, flank pain, hematuria and hesitancy.   Neurological: Positive for weakness. Negative for headaches, light-headedness, loss of balance, numbness and paresthesias.   Psychiatric/Behavioral: Negative for altered mental status.   Allergic/Immunologic: Negative for environmental allergies.     Objective:     Vital Signs (Most Recent):  Temp: 97.4 °F (36.3 °C) (12/17/20 0750)  Pulse: 97 (12/17/20 1141)  Resp: 17 (12/17/20 0750)  BP: 139/80 (12/17/20 0750)  SpO2: 98 % (12/17/20 0750) Vital Signs (24h Range):  Temp:  [97.4 °F (36.3 °C)-98.9 °F (37.2 °C)] 97.4 °F (36.3 °C)  Pulse:  [] 97  Resp:  [17-22] 17  SpO2:  [97 %-100 %] 98 %  BP: (127-150)/(57-81) 139/80     Weight: (!) 141.5 kg (311 lb 15.2 oz)  Body mass index is 44.76 kg/m².     SpO2: 98 %  O2 Device (Oxygen Therapy): CPAP      Intake/Output Summary (Last 24 hours) at 12/17/2020 1414  Last  data filed at 12/17/2020 0500  Gross per 24 hour   Intake 1180 ml   Output --   Net 1180 ml       Lines/Drains/Airways     None                 Physical Exam   Constitutional: He is oriented to person, place, and time. He appears well-developed and well-nourished. No distress.   HENT:   Head: Normocephalic and atraumatic.   Eyes: Pupils are equal, round, and reactive to light.   Neck: Normal range of motion and full passive range of motion without pain. Neck supple. No JVD present.   Cardiovascular: Regular rhythm, S1 normal, S2 normal and intact distal pulses. Tachycardia present. PMI is not displaced. Exam reveals distant heart sounds.   No murmur heard.  Pulses:       Radial pulses are 2+ on the right side and 2+ on the left side.        Dorsalis pedis pulses are 2+ on the right side and 2+ on the left side.   CHEST PAIN FREE   Pulmonary/Chest: Effort normal and breath sounds normal. No accessory muscle usage. No respiratory distress. He has no decreased breath sounds. He has no wheezes. He has no rales.   Abdominal: Soft. Bowel sounds are normal. He exhibits no distension. There is no abdominal tenderness.   Morbidly obese abdomen   Musculoskeletal: Normal range of motion.         General: No edema.      Right ankle: He exhibits no swelling.      Left ankle: He exhibits no swelling.   Neurological: He is alert and oriented to person, place, and time.   Skin: Skin is warm and dry. He is not diaphoretic. No cyanosis. Nails show no clubbing.   Psychiatric: He has a normal mood and affect. His speech is normal and behavior is normal. Judgment and thought content normal. Cognition and memory are normal.   Nursing note and vitals reviewed.      Significant Labs:   BMP:   Recent Labs   Lab 12/16/20  0621 12/17/20  0723   * 172*    137   K 4.0 3.6    101   CO2 29 27   BUN 21* 26*   CREATININE 1.3 1.2   CALCIUM 9.4 9.5   MG 1.7 1.9   , CBC   Recent Labs   Lab 12/16/20  0622 12/17/20  0723   WBC 10.19  7.15   HGB 13.9* 14.1   HCT 44.1 45.0    253    and All pertinent lab results from the last 24 hours have been reviewed.    Significant Imaging: Echocardiogram:   Transthoracic echo (TTE) complete (Cupid Only):   Results for orders placed or performed during the hospital encounter of 12/15/20   Echo Color Flow Doppler? Yes   Result Value Ref Range    BSA 2.64 m2    Ascending aorta 3.50 cm    STJ 3.54 cm    AV mean gradient 4 mmHg    Ao peak bunny 1.22 m/s    Ao VTI 18.88 cm    IVRT 82.78 msec    IVS 1.75 (A) 0.6 - 1.1 cm    LA size 3.71 cm    Left Atrium Major Axis 5.15 cm    Left Atrium Minor Axis 3.61 cm    LVIDd 3.77 3.5 - 6.0 cm    LVIDs 2.42 2.1 - 4.0 cm    LVOT diameter 2.26 cm    LVOT peak VTI 16.44 cm    Posterior Wall 1.71 (A) 0.6 - 1.1 cm    MV Peak A Bunny 0.67 m/s    E wave decelartion time 196.61 msec    MV Peak E Bunny 0.70 m/s    RA Major Axis 3.62 cm    Sinus 3.72 cm    TAPSE 1.99 cm    TR Max Bunny 2.25 m/s    TDI LATERAL 0.08 m/s    TDI SEPTAL 0.06 m/s    Ao root annulus 3.90 cm    LV Diastolic Volume 60.97 mL    LV Systolic Volume 20.49 mL    LVOT peak bunny 0.92 m/s    RVOT peak VTI 14.03 cm    RVOT peak bunny 0.76 m/s    PV mean gradient 2 mmHg    LV LATERAL E/E' RATIO 8.75 m/s    LV SEPTAL E/E' RATIO 11.67 m/s    FS 36 %    LV mass 270.46 g    Left Ventricle Relative Wall Thickness 0.91 cm    AV valve area 3.49 cm2    AV Velocity Ratio 0.75     AV index (prosthetic) 0.87     E/A ratio 1.04     Mean e' 0.07 m/s    LVOT area 4.0 cm2    LVOT stroke volume 65.92 cm3    AV peak gradient 6 mmHg    E/E' ratio 10.00 m/s    LV Systolic Volume Index 8.1 mL/m2    LV Diastolic Volume Index 24.25 mL/m2    LV Mass Index 108 g/m2    Triscuspid Valve Regurgitation Peak Gradient 20 mmHg    Right Atrial Pressure (from IVC) 3 mmHg    TV rest pulmonary artery pressure 23 mmHg    Narrative    · The left ventricle is normal in size with normal systolic function.   There is left ventricular concentric remodeling. The  estimated ejection   fraction is 60%.  · Normal left ventricular diastolic function.  · Normal right ventricular size with normal right ventricular systolic   function.  · There is mild tricuspid valve regurgitation.  · Normal central venous pressure (3 mmHg).  · The estimated PA systolic pressure is 23 mmHg.  · The aortic root is mildly dilated.

## 2020-12-17 NOTE — ASSESSMENT & PLAN NOTE
--accuchecks and SSI  --Levemir increased to 25u q HS  --HA1C 10.3  --diabetic diet  --diabetes educator following  --will need OP follow up

## 2020-12-17 NOTE — NURSING
Discharge instructions reviewed with patient in detail, he verbalized understanding. PIV intact on removal. Tele monitor returned to monitor room. Stressed the importance of keeping/attending scheduled f/u appts. Patient stated he would make some of his own appts. Prescriptions sent to pharmacy of choice. AVS printed and given to patient. Instructed to call for wheelchair when ready to d/c.

## 2020-12-17 NOTE — SUBJECTIVE & OBJECTIVE
Interval History: Patient to have 2nd part of NM stress test today. Denies CP. Appreciated cardiology recs    Review of Systems   Constitutional: Negative for activity change, appetite change, chills, diaphoresis, fatigue and fever.   HENT: Negative for congestion, dental problem, ear pain, hearing loss, mouth sores, nosebleeds, sinus pressure, sore throat, tinnitus and trouble swallowing.    Eyes: Negative for pain, discharge, redness and visual disturbance.   Respiratory: Negative for cough, choking, chest tightness, shortness of breath and wheezing.    Cardiovascular: Negative for chest pain, palpitations and leg swelling.   Gastrointestinal: Negative for abdominal distention, abdominal pain, anal bleeding, blood in stool, constipation, diarrhea, nausea and vomiting.   Endocrine: Negative for cold intolerance, heat intolerance, polydipsia, polyphagia and polyuria.   Genitourinary: Negative for decreased urine volume, difficulty urinating, dysuria, flank pain, frequency, hematuria and urgency.   Musculoskeletal: Negative for arthralgias, back pain, gait problem, myalgias, neck pain and neck stiffness.   Skin: Negative for color change, rash and wound.   Allergic/Immunologic: Negative.    Neurological: Negative for dizziness, tremors, seizures, syncope, speech difficulty, weakness, light-headedness, numbness and headaches.   Hematological: Negative.    Psychiatric/Behavioral: Negative for agitation, behavioral problems, confusion and sleep disturbance. The patient is not nervous/anxious.    All other systems reviewed and are negative.    Objective:     Vital Signs (Most Recent):  Temp: 97.4 °F (36.3 °C) (12/17/20 0750)  Pulse: 87 (12/17/20 0750)  Resp: 17 (12/17/20 0750)  BP: 139/80 (12/17/20 0750)  SpO2: 98 % (12/17/20 0750) Vital Signs (24h Range):  Temp:  [97.4 °F (36.3 °C)-98.9 °F (37.2 °C)] 97.4 °F (36.3 °C)  Pulse:  [] 87  Resp:  [16-22] 17  SpO2:  [97 %-100 %] 98 %  BP: (102-150)/(55-81) 139/80      Weight: (!) 141.5 kg (311 lb 15.2 oz)  Body mass index is 44.76 kg/m².    Intake/Output Summary (Last 24 hours) at 12/17/2020 0948  Last data filed at 12/16/2020 1700  Gross per 24 hour   Intake 700 ml   Output --   Net 700 ml      Physical Exam  Vitals signs and nursing note reviewed.   Constitutional:       General: He is not in acute distress.     Appearance: He is morbidly obese. He is not diaphoretic.   HENT:      Head: Normocephalic and atraumatic.      Right Ear: External ear normal.      Left Ear: External ear normal.      Nose: Nose normal.   Eyes:      General:         Right eye: No discharge.         Left eye: No discharge.      Pupils: Pupils are equal, round, and reactive to light.   Neck:      Musculoskeletal: Normal range of motion and neck supple.   Cardiovascular:      Rate and Rhythm: Regular rhythm. Tachycardia present.   Pulmonary:      Effort: Pulmonary effort is normal.      Breath sounds: Normal breath sounds.   Abdominal:      General: Bowel sounds are normal.      Palpations: Abdomen is soft. There is no mass.      Tenderness: There is no abdominal tenderness.   Musculoskeletal: Normal range of motion.   Skin:     General: Skin is warm and dry.   Neurological:      Mental Status: He is alert and oriented to person, place, and time.   Psychiatric:         Behavior: Behavior normal.         Significant Labs:   Recent Lab Results       12/17/20  0723   12/17/20  0646   12/16/20  2144        Anion Gap 9         Baso # 0.03         Basophil % 0.4         BUN 26         Calcium 9.5         Chloride 101         CO2 27         Creatinine 1.2         Differential Method Automated         eGFR if  >60         eGFR if non  >60  Comment:  Calculation used to obtain the estimated glomerular filtration  rate (eGFR) is the CKD-EPI equation.            Eos # 0.3         Eosinophil % 3.9         Glucose 172         Gran # (ANC) 4.3         Gran % 59.9         Hematocrit  45.0         Hemoglobin 14.1         Immature Grans (Abs) 0.03  Comment:  Mild elevation in immature granulocytes is non specific and   can be seen in a variety of conditions including stress response,   acute inflammation, trauma and pregnancy. Correlation with other   laboratory and clinical findings is essential.           Immature Granulocytes 0.4         Lymph # 1.8         Lymph % 25.3         Magnesium 1.9         MCH 28.3         MCHC 31.3         MCV 90         Mono # 0.7         Mono % 10.1         MPV 11.9         nRBC 0         Phosphorus 3.8         Platelets 253         POCT Glucose   155 180     Potassium 3.6         RBC 4.99         RDW 12.4         Sodium 137         WBC 7.15             All pertinent labs within the past 24 hours have been reviewed.    Significant Imaging: I have reviewed all pertinent imaging results/findings within the past 24 hours.

## 2020-12-17 NOTE — PROGRESS NOTES
[facility] Hospital Medicine  Progress Note    Patient Name: Osmany Stevens  MRN: [id number]  Patient Class: OP- Observation   Admission Date: 12/15/2020  Length of Stay: 0 days  Attending Physician: Ángel Padron MD  Primary Care Provider: Primary Doctor No        Subjective:     Principal Problem:Chest pain        HPI:  50-year-old Male with a PMHx significant for uncontrolled HTN, uncontrolled T2DM, MI, 1 Stent Placement presented to the ED this morning after experiencing CP this morning on the way to his cardiology appointment. Patient states that his CP is felt most in his mid-chest, it is 10/10 in severity without radiation, and he states that he was concerned because it felt similar to how it felt when he had a heart attack. Patient states the only associated symptom is a headache, he denies weakness/numbness, diaphoresis, palpitations, arm/shoulder/jaw pain, N/V, dizziness, abdominal pain, leg pain/cramping. He states that when he rests it will alleviate the CP, and the CP is brought on by a specific amout of exertion typically, but he can not specify the amount. He states that it has been occurring off and on for a while now but could not specify for how long.    Patient does not seem to be taking most of his medications. He could not say what he was on. He states that he was never able to take his insulin because he was not supplied with needles and syringes and also that his glucometer was not working.   ED workup shows: initial ECG revealed no abnormalities when comparing it to a previous one obtained in Sept. 2020. BNP, CK, and Troponins WNL. BP is elevated, glucose is elevated.  He is a full code and his SDM is his niece, Katarina.  He will be kept on OBS for chest pain under the care of hospital medicine.       Overview/Hospital Course:  Patient was kept on OBS for chest pain under the care of hospital medicine. Cardiology was consulted. 12/16: Cardiology plans on NM stress test  today. Denies CP at present    Interval History: Patient to have 2nd part of NM stress test today. Denies CP. Appreciated cardiology recs    Review of Systems   Constitutional: Negative for activity change, appetite change, chills, diaphoresis, fatigue and fever.   HENT: Negative for congestion, dental problem, ear pain, hearing loss, mouth sores, nosebleeds, sinus pressure, sore throat, tinnitus and trouble swallowing.    Eyes: Negative for pain, discharge, redness and visual disturbance.   Respiratory: Negative for cough, choking, chest tightness, shortness of breath and wheezing.    Cardiovascular: Negative for chest pain, palpitations and leg swelling.   Gastrointestinal: Negative for abdominal distention, abdominal pain, anal bleeding, blood in stool, constipation, diarrhea, nausea and vomiting.   Endocrine: Negative for cold intolerance, heat intolerance, polydipsia, polyphagia and polyuria.   Genitourinary: Negative for decreased urine volume, difficulty urinating, dysuria, flank pain, frequency, hematuria and urgency.   Musculoskeletal: Negative for arthralgias, back pain, gait problem, myalgias, neck pain and neck stiffness.   Skin: Negative for color change, rash and wound.   Allergic/Immunologic: Negative.    Neurological: Negative for dizziness, tremors, seizures, syncope, speech difficulty, weakness, light-headedness, numbness and headaches.   Hematological: Negative.    Psychiatric/Behavioral: Negative for agitation, behavioral problems, confusion and sleep disturbance. The patient is not nervous/anxious.    All other systems reviewed and are negative.    Objective:     Vital Signs (Most Recent):  Temp: 97.4 °F (36.3 °C) (12/17/20 0750)  Pulse: 87 (12/17/20 0750)  Resp: 17 (12/17/20 0750)  BP: 139/80 (12/17/20 0750)  SpO2: 98 % (12/17/20 0750) Vital Signs (24h Range):  Temp:  [97.4 °F (36.3 °C)-98.9 °F (37.2 °C)] 97.4 °F (36.3 °C)  Pulse:  [] 87  Resp:  [16-22] 17  SpO2:  [97 %-100 %] 98 %  BP:  (102-150)/(55-81) 139/80     Weight: (!) 141.5 kg (311 lb 15.2 oz)  Body mass index is 44.76 kg/m².    Intake/Output Summary (Last 24 hours) at 12/17/2020 0948  Last data filed at 12/16/2020 1700  Gross per 24 hour   Intake 700 ml   Output --   Net 700 ml      Physical Exam  Vitals signs and nursing note reviewed.   Constitutional:       General: He is not in acute distress.     Appearance: He is morbidly obese. He is not diaphoretic.   HENT:      Head: Normocephalic and atraumatic.      Right Ear: External ear normal.      Left Ear: External ear normal.      Nose: Nose normal.   Eyes:      General:         Right eye: No discharge.         Left eye: No discharge.      Pupils: Pupils are equal, round, and reactive to light.   Neck:      Musculoskeletal: Normal range of motion and neck supple.   Cardiovascular:      Rate and Rhythm: Regular rhythm. Tachycardia present.   Pulmonary:      Effort: Pulmonary effort is normal.      Breath sounds: Normal breath sounds.   Abdominal:      General: Bowel sounds are normal.      Palpations: Abdomen is soft. There is no mass.      Tenderness: There is no abdominal tenderness.   Musculoskeletal: Normal range of motion.   Skin:     General: Skin is warm and dry.   Neurological:      Mental Status: He is alert and oriented to person, place, and time.   Psychiatric:         Behavior: Behavior normal.         Significant Labs:   Recent Lab Results       12/17/20  0723   12/17/20  0646   12/16/20  2144        Anion Gap 9         Baso # 0.03         Basophil % 0.4         BUN 26         Calcium 9.5         Chloride 101         CO2 27         Creatinine 1.2         Differential Method Automated         eGFR if  >60         eGFR if non  >60  Comment:  Calculation used to obtain the estimated glomerular filtration  rate (eGFR) is the CKD-EPI equation.            Eos # 0.3         Eosinophil % 3.9         Glucose 172         Gran # (ANC) 4.3         Gran %  59.9         Hematocrit 45.0         Hemoglobin 14.1         Immature Grans (Abs) 0.03  Comment:  Mild elevation in immature granulocytes is non specific and   can be seen in a variety of conditions including stress response,   acute inflammation, trauma and pregnancy. Correlation with other   laboratory and clinical findings is essential.           Immature Granulocytes 0.4         Lymph # 1.8         Lymph % 25.3         Magnesium 1.9         MCH 28.3         MCHC 31.3         MCV 90         Mono # 0.7         Mono % 10.1         MPV 11.9         nRBC 0         Phosphorus 3.8         Platelets 253         POCT Glucose   155 180     Potassium 3.6         RBC 4.99         RDW 12.4         Sodium 137         WBC 7.15             All pertinent labs within the past 24 hours have been reviewed.    Significant Imaging: I have reviewed all pertinent imaging results/findings within the past 24 hours.      Assessment/Plan:      * Chest pain  --initial troponin 0.006  --ECG shows no significant abnormalities when compared to one done in Sept. 2020.   --troponin's WNL X 4  --BNP and CPK WNL  --cardiology following  --aspirin, statin, plavix, BB, ace inhibitor, Imdur  --MPI stress test today  --nitroglycerine paste  --telemetry monitoring    Type 2 diabetes mellitus, with long-term current use of insulin  --accuchecks and SSI  --Levemir increased to 25u q HS  --HA1C 10.3  --diabetic diet  --diabetes educator following  --will need OP follow up    Essential hypertension  --continue lisinopril, metoprolol, isosorbide, furosemide      SHANNON on CPAP  --cpap q HS      Morbidly obese  --counseled on weight loss      History of MI (myocardial infarction)  --pt had MI in 2011 with stent placement in [place]  --Pt has not been taking any medications in at least 1 year.      Mixed hyperlipidemia  --crestor  --cardiac diet        VTE Risk Mitigation (From admission, onward)         Ordered     IP VTE HIGH RISK PATIENT  Once      12/15/20  1241     Place sequential compression device  Until discontinued      12/15/20 1241                Discharge Planning   NILSON:      Code Status: Full Code   Is the patient medically ready for discharge?:     Reason for patient still in hospital (select all that apply): Patient trending condition, Imaging and Consult recommendations  Discharge Plan A: Home with family                  CORA Kapoor  Department of Hospital Medicine   [facility]

## 2020-12-17 NOTE — CONSULTS
Diet Education: Diabetes Education   Time Spent: 15 minutes   Learners: Patient    Nutrition Education provided with handouts:   Carbohydrate Counting for Diabetes from nutritioncaremanual.org    Comments:    The patient was alert, engaged and familiar with the content. We reviewed carbohydrate content in food choices along with carbohydrate counting at mealtime. He asked about butter supplements and we discussed recommended substitutes. Patient stated that he has received education regarding BG management through food choices from another dietitian prior to his visit. Handouts left at bedside.      All questions and concerns answered. Dietitian's contact information provided.       Follow-Up: 1 week     Please Re-consult as needed      Deepali Cobb, Dietetic Intern     Reviewed by: Tawanna Pacheco RD, LDN

## 2020-12-17 NOTE — NURSING
"Diabetes follow-up:  Met with patient regarding diabetes follow-up  He does appear to have some cognitive deficits and answers "ok" to questions which makes it difficult  to determine level of understanding and retention  Of info  He reports he was diagnosed in his 40's.  He moved from [place] to [place] a year ago  He lives with a niece.    He has a home glucose monitor but is unsure if it works    He used it only once successfully in the last week and reports a glucose of 188.  The other attempts to test were unsuccessful  Provided with a Contour next glucose monitor and demonstrated use.  Recommended he test FBS daily and record results on provided log.  He has a vial of Lantus insulin which is unopened.  Reports he was provided with this from his PCP but does not have syringes.  Provided with 10 insulin syringes and demonstrated use.   He is unsure of his dose and will need a dosage .  Instructed in site selection/rotation and insulin storage.  Instructed in target glucose values, hyper/hypoglycemia info.  He will benefit from follow-up   "

## 2020-12-17 NOTE — DISCHARGE SUMMARY
[facility] Hospital Medicine  Discharge Summary      Patient Name: Osmany Stevens  MRN: [id number]  Patient Class: OP- Observation  Admission Date: 12/15/2020  Hospital Length of Stay: 0 days  Discharge Date and Time: No discharge date for patient encounter.  Attending Physician: Ángel Padron MD   Discharging Provider: CORA Kapoor  Primary Care Provider: Primary Doctor No      HPI:   50-year-old Male with a PMHx significant for uncontrolled HTN, uncontrolled T2DM, MI, 1 Stent Placement presented to the ED this morning after experiencing CP this morning on the way to his cardiology appointment. Patient states that his CP is felt most in his mid-chest, it is 10/10 in severity without radiation, and he states that he was concerned because it felt similar to how it felt when he had a heart attack. Patient states the only associated symptom is a headache, he denies weakness/numbness, diaphoresis, palpitations, arm/shoulder/jaw pain, N/V, dizziness, abdominal pain, leg pain/cramping. He states that when he rests it will alleviate the CP, and the CP is brought on by a specific amout of exertion typically, but he can not specify the amount. He states that it has been occurring off and on for a while now but could not specify for how long.    Patient does not seem to be taking most of his medications. He could not say what he was on. He states that he was never able to take his insulin because he was not supplied with needles and syringes and also that his glucometer was not working.   ED workup shows: initial ECG revealed no abnormalities when comparing it to a previous one obtained in Sept. 2020. BNP, CK, and Troponins WNL. BP is elevated, glucose is elevated.  He is a full code and his SDM is his niece, Katarina.  He will be kept on OBS for chest pain under the care of hospital medicine.       * No surgery found *      Hospital Course:   Patient was kept on OBS for chest pain under the care  of hospital medicine. Cardiology was consulted. Negative stress test. Medication adjustments made. Discussed with pt about importance of taking medication as ordered. Diabetes educator consulted, who went over checking blood sugar, gave patient working glucometer, educated patient on self injecting insulin and importance of diet compliance. Reg dietitian consulted, who reinforced diabetic diet choices and importance of watching diet closely. He will dc on: daily ASA, plavix, BB, imdur, statin, and ACEi for heart health. He will dc on lantus, metformin, and januvia for diabetes. He will f/u OP with diabetes clinic, Dr. Benson, and his PCP, Janet Rousseau. Patient verbalized understanding of all and agreed to take medications as ordered. Rx sent to [facility], [address] Discussed plan with gwendolyn via telephone. Patient was seen and examined today and deemed stable for discharge home.     Consults:   Consults (From admission, onward)        Status Ordering Provider     Inpatient consult to Cardiology  Once     Provider:  Beto Loyola MD    Completed NATIVIDAD MOORE     Inpatient consult to Diabetes educator  Once     Provider:  (Not yet assigned)    Completed NATIVIDAD MOORE     Inpatient consult to Registered Dietitian/Nutritionist  Once     Provider:  (Not yet assigned)    Completed JOHNNIE ALCANTARA          No new Assessment & Plan notes have been filed under this hospital service since the last note was generated.  Service: Hospital Medicine    Final Active Diagnoses:    Diagnosis Date Noted POA    Type 2 diabetes mellitus, with long-term current use of insulin [E11.9, Z79.4] 12/01/2020 Not Applicable    Morbidly obese [E66.01] 12/15/2020 Yes    SHNANON on CPAP [G47.33, Z99.89] 12/15/2020 Not Applicable    Essential hypertension [I10] 12/15/2020 Yes    Mixed hyperlipidemia [E78.2] 12/01/2020 Yes    History of MI (myocardial infarction) [I25.2] 12/01/2020 Not Applicable      Problems Resolved During this  Admission:    Diagnosis Date Noted Date Resolved POA    PRINCIPAL PROBLEM:  Chest pain [R07.9] 12/15/2020 12/17/2020 Yes       Discharged Condition: stable    Disposition: Home or Self Care    Follow Up:  Follow-up Information     [facility] Diabetes [facility] In 1 week.    Specialty: Diabetes  Why: hospital follow up  Contact information:  [address] [facility] [id number] [phone]  Additional information:  2nd Floor - From [address], take the [facility] exit [address] Enter the facility from the Service [address]Sobia Rousseau PA-C In 3 days.    Specialty: Internal Medicine  Why: hospital follow up  Contact information:  [address]  [place] [address]  [phone]             Alberto Benson MD In 1 month.    Specialties: Cardiology, INTERVENTIONAL CARDIOLOGY  Why: hospital follow up  Contact information:  [address]  [place] [address]  [phone]                 Patient Instructions:      Diet Cardiac     Diet diabetic     Notify your health care provider if you experience any of the following:  severe uncontrolled pain     Notify your health care provider if you experience any of the following:  difficulty breathing or increased cough     Notify your health care provider if you experience any of the following:  persistent dizziness, light-headedness, or visual disturbances     Activity as tolerated       Significant Diagnostic Studies: Labs:   BMP:   Recent Labs   Lab 12/15/20  1319 12/16/20  0621 12/17/20  0723   GLU  --  174* 172*   NA  --  139 137   K  --  4.0 3.6   CL  --  102 101   CO2  --  29 27   BUN  --  21* 26*   CREATININE  --  1.3 1.2   CALCIUM  --  9.4 9.5   MG 1.8 1.7 1.9   , CMP   Recent Labs   Lab 12/16/20  0621 12/17/20  0723    137   K 4.0 3.6    101   CO2 29 27   * 172*   BUN 21* 26*   CREATININE 1.3 1.2   CALCIUM 9.4 9.5   ANIONGAP 8 9   ESTGFRAFRICA >60 >60   EGFRNONAA >60 >60   , CBC   Recent Labs   Lab  12/16/20  0622 12/17/20  0723   WBC 10.19 7.15   HGB 13.9* 14.1   HCT 44.1 45.0    253    and All labs within the past 24 hours have been reviewed    Pending Diagnostic Studies:     None         Medications:  Reconciled Home Medications:      Medication List      START taking these medications    aspirin 81 MG EC tablet  Commonly known as: ECOTRIN  Take 1 tablet (81 mg total) by mouth once daily.  Start taking on: December 18, 2020     isosorbide mononitrate 30 MG 24 hr tablet  Commonly known as: IMDUR  Take 1 tablet (30 mg total) by mouth once daily.  Start taking on: December 18, 2020     metoprolol tartrate 25 MG tablet  Commonly known as: LOPRESSOR  Take 1 tablet (25 mg total) by mouth 2 (two) times daily.        CONTINUE taking these medications    ACCU-CHEK BRINDA PLUS METER Misc  Generic drug: blood-glucose meter  Use as instructed     ACCU-CHEK BRINDA PLUS TEST STRP Strp  Generic drug: blood sugar diagnostic  Use to check blood sugar three times a day     ACCU-CHEK SOFTCLIX LANCETS Misc  Generic drug: lancets  Use to check blood sugar three times a day     clopidogreL 75 mg tablet  Commonly known as: PLAVIX  Take 1 tablet (75 mg total) by mouth once daily.     insulin glargine 100 unit/mL injection  Commonly known as: LANTUS  Inject 20 Units into the skin every evening.     JANUVIA 100 MG Tab  Generic drug: SITagliptin  Take 1 tablet (100 mg total) by mouth once daily.     lisinopriL 20 MG tablet  Commonly known as: PRINIVIL,ZESTRIL  Take 1 tablet (20 mg total) by mouth once daily.     metFORMIN 1000 MG tablet  Commonly known as: GLUCOPHAGE  Take 1 tablet (1,000 mg total) by mouth 2 (two) times daily.     rosuvastatin 40 MG Tab  Commonly known as: CRESTOR  Take 1 tablet (40 mg total) by mouth every evening.            Indwelling Lines/Drains at time of discharge:   Lines/Drains/Airways     None                 Time spent on the discharge of patient: 60 minutes  Patient was seen and examined on the date  of discharge and determined to be suitable for discharge.         ARLIN KapoorP-C  Department of Hospital Medicine  [facility]

## 2020-12-17 NOTE — PLAN OF CARE
Pt AAOx4. VSS. Pt remained free of falls this shift. No complaints of pain or discomfort. Medications administered as ordered. Blood glucose monitoring, pt has done well to control his blood sugars with intake and long acting insulin in the evening and morning.. Pt is normal sinus on monitor. Hourly rounding completed. Pt instructed to call for assistance. POC reviewed. Pt verbalized understanding. Will continue to monitor.

## 2020-12-17 NOTE — ASSESSMENT & PLAN NOTE
--initial troponin 0.006  --ECG shows no significant abnormalities when compared to one done in Sept. 2020.   --troponin's WNL X 4  --BNP and CPK WNL  --cardiology following  --aspirin, statin, plavix, BB, ace inhibitor, Imdur  --MPI stress test today  --nitroglycerine paste  --telemetry monitoring

## 2020-12-18 NOTE — TELEPHONE ENCOUNTER
----- Message from Beth Mendoza sent at 12/18/2020 10:30 AM CST -----  Regarding: appt  Contact: patient  Patient states he was in hospital and told to follow up with Diabetes mgmt as soon as possible, but there are no openings, please call him back at [phone]

## 2020-12-18 NOTE — PLAN OF CARE
12/18/20 0740   Final Note   Assessment Type Final Discharge Note   Anticipated Discharge Disposition Home   Right [facility] Referral Info   Post Acute Recommendation No Care

## 2020-12-22 PROBLEM — E78.5 HYPERLIPIDEMIA ASSOCIATED WITH TYPE 2 DIABETES MELLITUS: Chronic | Status: ACTIVE | Noted: 2020-01-01

## 2020-12-22 PROBLEM — E11.59 HYPERTENSION ASSOCIATED WITH DIABETES: Chronic | Status: ACTIVE | Noted: 2020-01-01

## 2020-12-22 PROBLEM — R07.9 CHEST PAIN ON EXERTION: Status: RESOLVED | Noted: 2020-01-01 | Resolved: 2020-01-01

## 2020-12-22 PROBLEM — E11.69 HYPERLIPIDEMIA ASSOCIATED WITH TYPE 2 DIABETES MELLITUS: Chronic | Status: ACTIVE | Noted: 2020-01-01

## 2020-12-22 PROBLEM — Z79.4 TYPE 2 DIABETES MELLITUS, WITH LONG-TERM CURRENT USE OF INSULIN: Chronic | Status: ACTIVE | Noted: 2020-01-01

## 2020-12-22 PROBLEM — I15.2 HYPERTENSION ASSOCIATED WITH DIABETES: Chronic | Status: ACTIVE | Noted: 2020-01-01

## 2020-12-22 PROBLEM — E78.5 HYPERLIPIDEMIA ASSOCIATED WITH TYPE 2 DIABETES MELLITUS: Status: ACTIVE | Noted: 2020-01-01

## 2020-12-22 PROBLEM — I15.2 HYPERTENSION ASSOCIATED WITH DIABETES: Status: ACTIVE | Noted: 2020-01-01

## 2020-12-22 PROBLEM — E11.69 HYPERLIPIDEMIA ASSOCIATED WITH TYPE 2 DIABETES MELLITUS: Status: ACTIVE | Noted: 2020-01-01

## 2020-12-22 PROBLEM — E11.9 TYPE 2 DIABETES MELLITUS, WITH LONG-TERM CURRENT USE OF INSULIN: Chronic | Status: ACTIVE | Noted: 2020-01-01

## 2020-12-22 PROBLEM — I25.118 CORONARY ARTERY DISEASE OF NATIVE ARTERY OF NATIVE HEART WITH STABLE ANGINA PECTORIS: Status: ACTIVE | Noted: 2020-01-01

## 2020-12-22 PROBLEM — I25.118 CORONARY ARTERY DISEASE OF NATIVE ARTERY OF NATIVE HEART WITH STABLE ANGINA PECTORIS: Chronic | Status: ACTIVE | Noted: 2020-01-01

## 2020-12-22 PROBLEM — E66.01 MORBID OBESITY WITH BODY MASS INDEX OF 40.0-44.9 IN ADULT: Chronic | Status: ACTIVE | Noted: 2020-01-01

## 2020-12-22 PROBLEM — I25.2 HISTORY OF MI (MYOCARDIAL INFARCTION): Chronic | Status: ACTIVE | Noted: 2020-01-01

## 2020-12-22 PROBLEM — E11.59 HYPERTENSION ASSOCIATED WITH DIABETES: Status: ACTIVE | Noted: 2020-01-01

## 2020-12-22 NOTE — PROGRESS NOTES
Subjective:   Patient ID: Osmany Stevens is a 50 y.o. male.  Chief Complaint:  Establish Care    Patient presents to establish care  Previously relocated from [place] but none able to establish medical care due to lack of insurance.    Recently discharged from the hospital after evaluation for chest pain.  This admission was his most recent episode for care of chronic medical conditions.    His chest pain was felt to be angina related to his underlying coronary artery disease.     he was recommended for discharge on medical management with Lopressor, lisinopril, Imdur, Crestor, and Plavix with outpatient follow-up and probable need for heart catheterization.    His diabetes mellitus was also noted to be uncontrolled greater than 10%.    Overall very poor historian, but medical history significant for.    - Diabetes mellitus.  Uncontrolled.  Presently appears to be compliant since discharge with Lantus 20 units daily, metformin 1000 mg twice a day, and Januvia 100 mg daily.  - Hypertension.  Controlled compliant with Lopressor 25 mg twice a day lisinopril 20 mg daily.    - Hyperlipidemia with known coronary artery disease and history of MI.  Stable angina.  Compliant with Imdur 30 mg daily, Plavix 75 mg daily, aspirin 81 mg daily.  However Crestor  40 mg is not in his medication bag today. Presently he is pain free.  Reports intermittent pain since discharge.  Has follow-up with cardiology tomorrow.    - Morbid obesity on SHANNON with CPAP.    Health maintenance needs include:  Tetanus booster, flu vaccine, pneumonia vaccine    Other than establishing care to try to get medical conditions under better control, no additional complaints today.    Chest Pain   This is a chronic problem. The current episode started more than 1 month ago. The onset quality is gradual. The problem occurs intermittently. The problem has been waxing and waning. The pain is present in the substernal region. The pain is at a severity of 5/10. The  pain is moderate. The quality of the pain is described as burning and pressure. The pain does not radiate. Associated symptoms include malaise/fatigue. Pertinent negatives include no abdominal pain, back pain, claudication, cough, diaphoresis, dizziness, exertional chest pressure, fever, headaches, hemoptysis, irregular heartbeat, lower extremity edema, nausea, near-syncope, numbness, orthopnea, palpitations, PND, sputum production, syncope, vomiting or weakness. The pain is aggravated by nothing. He has tried nitroglycerin for the symptoms. The treatment provided significant relief.   His past medical history is significant for CAD, heart disease, hypertension and MI.   Pertinent negatives for past medical history include no anxiety/panic attacks, no arrhythmia, no COPD, no CHF, no PVD and no valve disorder.   His family medical history is significant for CAD, heart disease, hyperlipidemia and hypertension.   Diabetes  He presents for his follow-up diabetic visit. There are no hypoglycemic associated symptoms. Pertinent negatives for hypoglycemia include no dizziness, headaches, nervousness/anxiousness or tremors. Associated symptoms include blurred vision, polydipsia, polyphagia, polyuria and visual change. Pertinent negatives for diabetes include no fatigue, no foot paresthesias, no foot ulcerations, no weakness and no weight loss. There are no hypoglycemic complications. Diabetic complications include heart disease. Pertinent negatives for diabetic complications include no autonomic neuropathy, CVA, nephropathy, peripheral neuropathy, PVD or retinopathy. Risk factors: Known disease. Current diabetic treatment includes insulin injections and oral agent (dual therapy). He is compliant with treatment some of the time. His weight is stable. He is following a generally unhealthy diet. When asked about meal planning, he reported none. He has not had a previous visit with a dietitian. He rarely participates in  exercise. An ACE inhibitor/angiotensin II receptor blocker is being taken. He does not see a podiatrist.Eye exam is not current.       Current Outpatient Medications:     aspirin (ECOTRIN) 81 MG EC tablet, Take 1 tablet (81 mg total) by mouth once daily., Disp: 30 tablet, Rfl: 0    blood sugar diagnostic Strp, Use to check blood sugar three times a day, Disp: 200 each, Rfl: 11    blood-glucose meter (ACCU-CHEK BRINDA PLUS METER) Misc, Use as instructed, Disp: 1 each, Rfl: 0    insulin glargine (LANTUS) 100 unit/mL injection, Inject 20 Units into the skin every evening., Disp: 10 mL, Rfl: 1    JANUVIA 100 mg Tab, Take 1 tablet (100 mg total) by mouth once daily., Disp: 30 tablet, Rfl: 0    lancets (ACCU-CHEK SOFTCLIX LANCETS) Misc, Use to check blood sugar three times a day, Disp: 200 each, Rfl: 11    metFORMIN (GLUCOPHAGE) 1000 MG tablet, Take 1 tablet (1,000 mg total) by mouth 2 (two) times daily., Disp: 60 tablet, Rfl: 0    clopidogreL (PLAVIX) 75 mg tablet, Take 1 tablet (75 mg total) by mouth once daily., Disp: 90 tablet, Rfl: 3    isosorbide mononitrate (IMDUR) 60 MG 24 hr tablet, Take 1 tablet (60 mg total) by mouth once daily., Disp: 30 tablet, Rfl: 11    lisinopriL (PRINIVIL,ZESTRIL) 20 MG tablet, Take 1 tablet (20 mg total) by mouth once daily., Disp: 30 tablet, Rfl: 11    metoprolol tartrate (LOPRESSOR) 25 MG tablet, Take 1 tablet (25 mg total) by mouth 2 (two) times daily., Disp: 60 tablet, Rfl: 11    nitroGLYCERIN (NITROSTAT) 0.4 MG SL tablet, Place 1 tablet (0.4 mg total) under the tongue every 5 (five) minutes as needed for Chest pain., Disp: 100 tablet, Rfl: 3    rosuvastatin (CRESTOR) 40 MG Tab, Take 1 tablet (40 mg total) by mouth every evening., Disp: 90 tablet, Rfl: 4    Review of Systems   Constitutional: Positive for malaise/fatigue. Negative for chills, diaphoresis, fatigue, fever and weight loss.   Eyes: Positive for blurred vision and visual disturbance.   Respiratory: Negative  "for cough, hemoptysis, sputum production, chest tightness and wheezing.    Cardiovascular: Negative for palpitations, orthopnea, claudication, leg swelling, syncope, PND and near-syncope.   Gastrointestinal: Negative for abdominal pain, constipation, diarrhea, nausea and vomiting.   Endocrine: Positive for polydipsia, polyphagia and polyuria. Negative for cold intolerance and heat intolerance.   Genitourinary: Negative for difficulty urinating.   Musculoskeletal: Negative for back pain and neck pain.   Skin: Negative for rash.   Neurological: Negative for dizziness, tremors, syncope, weakness, light-headedness, numbness and headaches.   Hematological: Does not bruise/bleed easily.   Psychiatric/Behavioral: Negative for sleep disturbance. The patient is not nervous/anxious.      Objective:   /80 (BP Location: Right arm, Patient Position: Sitting, BP Method: Large (Manual))   Pulse 82   Temp 98.9 °F (37.2 °C) (Tympanic)   Ht 5' 10" (1.778 m)   Wt (!) 141.2 kg (311 lb 4.6 oz)   SpO2 97%   BMI 44.67 kg/m²     Physical Exam  Vitals signs and nursing note reviewed.   Constitutional:       General: He is not in acute distress.     Appearance: Normal appearance. He is well-developed. He is morbidly obese.   Eyes:      General: No scleral icterus.        Right eye: No discharge.         Left eye: No discharge.      Conjunctiva/sclera: Conjunctivae normal.      Right eye: Right conjunctiva is not injected.      Left eye: Left conjunctiva is not injected.   Neck:      Thyroid: No thyroid mass, thyromegaly or thyroid tenderness.      Vascular: No carotid bruit or JVD.   Cardiovascular:      Rate and Rhythm: Normal rate and regular rhythm.      Pulses:           Radial pulses are 2+ on the right side and 2+ on the left side.        Dorsalis pedis pulses are 2+ on the right side and 2+ on the left side.        Posterior tibial pulses are 2+ on the right side and 2+ on the left side.      Heart sounds: Normal heart " sounds. No murmur. No friction rub. No gallop.    Pulmonary:      Effort: Pulmonary effort is normal.      Breath sounds: Normal breath sounds. No wheezing, rhonchi or rales.   Abdominal:      General: There is no distension.      Palpations: Abdomen is soft.      Tenderness: There is no abdominal tenderness. There is no guarding or rebound.   Musculoskeletal:      Right lower leg: No edema.      Left lower leg: No edema.   Feet:      Right foot:      Protective Sensation: 5 sites tested. 5 sites sensed.      Skin integrity: No ulcer, blister, skin breakdown, erythema, warmth, callus or dry skin.      Toenail Condition: Right toenails are normal.      Left foot:      Protective Sensation: 5 sites tested. 5 sites sensed.      Skin integrity: No ulcer, blister, skin breakdown, erythema, warmth, callus or dry skin.      Toenail Condition: Left toenails are normal.   Skin:     General: Skin is warm and dry.      Findings: No rash.   Neurological:      Mental Status: He is alert and oriented to person, place, and time.      Coordination: Coordination is intact. Coordination normal.      Gait: Gait is intact. Gait normal.   Psychiatric:         Attention and Perception: He is inattentive.         Mood and Affect: Mood and affect normal.         Speech: Speech normal.         Behavior: Behavior normal. Behavior is cooperative.         Thought Content: Thought content normal.         Cognition and Memory: Cognition is impaired. Memory is impaired.         Judgment: Judgment normal.       Assessment:       ICD-10-CM ICD-9-CM   1. Hospital discharge follow-up  Z09 V67.59   2. Type 2 diabetes mellitus with hyperglycemia, with long-term current use of insulin  E11.65 250.00    Z79.4 790.29     V58.67   3. Coronary artery disease of native artery of native heart with stable angina pectoris  I25.118 414.01     413.9   4. Hyperlipidemia associated with type 2 diabetes mellitus  E11.69 250.80    E78.5 272.4   5. History of MI  (myocardial infarction)  I25.2 412   6. Hypertension associated with diabetes  E11.59 250.80    I10 401.9   7. Morbid obesity with body mass index of 40.0-44.9 in adult  E66.01 278.01    Z68.41 V85.41   8. SHANNON on CPAP  G47.33 327.23    Z99.89 V46.8   9. Need for influenza vaccination  Z23 V04.81   10. Need for pneumococcal vaccination  Z23 V03.82     Plan:   Hospital discharge follow-up  No significant worsening or decompensation since discharge.    Not compliant with starting/taking Crestor 40 mg daily   Otherwise medication list appears up-to-date and compliant  Has follow-up with Cardiology scheduled for tomorrow.      Type 2 diabetes mellitus with hyperglycemia, with long-term current use of insulin  -     Ambulatory referral/consult to Ophthalmology; Future; Expected date: 12/29/2020  Uncontrolled.  Foot exam stable.  Referred for eye exam.    Continue Lantus 20 units daily   Continue Januvia 100 mg daily   Continue metformin 1000 mg twice a day  Check glucose daily in the morning  Reassess 1 month     Coronary artery disease of native artery of native heart with stable angina pectoris  Hyperlipidemia associated with type 2 diabetes mellitus  History of MI (myocardial infarction)  Continue aspirin 81 mg daily   Continue Plavix 75 mg daily  Pickup Crestor 40 mg daily from the pharmacy as prescribed and take on a regular basis  Follow-up with cardiology tomorrow.      Hypertension associated with diabetes  Control.  BP at goal.    Continue Lopressor 25 mg twice a day  Continue lisinopril 20 mg daily.      Morbid obesity with body mass index of 40.0-44.9 in adult  SHANNON on CPAP    RHM  -     Influenza - Quadrivalent (PF)  -     Pneumococcal Polysaccharide Vaccine (23 Valent) (SQ/IM)  Recommend tetanus booster through pharmacy.    Return to clinic 1 month

## 2020-12-22 NOTE — PROGRESS NOTES
Subjective:   Patient ID:  Osmany Stevens is a 50 y.o. male who presents for evaluation of No chief complaint on file.   Patient is here for evaluation history of coronary disease diabetes and hypertension.  Family history is consistent with diabetes.  Patient denies chest pain, angina or symptoms associated with anginal equivalent.  A review of the nuclear scan is consistent with anterior ischemia of a mild degree.  Recurrent cp and will order increased imdur add nitro and will get cath to check LAD.  Patient has recurrent chest discomfort and had chest discomfort today coming into the office relieved with nitroglycerin.  I renewed his nitroglycerin sublingual p.r.n. chest pain and increase the Imdur.  The patient is in agreement that he will go ahead with heart catheterization.  This pleasant patient recently seen at the hospital with evidence of intermittent chest discomfort.  He is from [place] and new to this area.  The patient states he had heart catheterization 11 years ago and at that time had a stent.  This was at [facility].  Since that time he has been pain-free until recently in last several months he has had exertional angina increasing over the last several weeks.    Family History   Problem Relation Age of Onset    Diabetes Mother     Diabetes Father      Past Medical History:   Diagnosis Date    Coronary artery disease     Diabetes mellitus, type 2     Hypertension      Social History     Socioeconomic History    Marital status:      Spouse name: Not on file    Number of children: Not on file    Years of education: Not on file    Highest education level: Not on file   Occupational History    Not on file   Social Needs    Financial resource strain: Not on file    Food insecurity     Worry: Not on file     Inability: Not on file    Transportation needs     Medical: Not on file     Non-medical: Not on file   Tobacco Use    Smoking status: Never Smoker    Smokeless  tobacco: Never Used   Substance and Sexual Activity    Alcohol use: Never     Frequency: Never    Drug use: Never    Sexual activity: Yes     Partners: Female   Lifestyle    Physical activity     Days per week: Not on file     Minutes per session: Not on file    Stress: Not on file   Relationships    Social connections     Talks on phone: Not on file     Gets together: Not on file     Attends Faith service: Not on file     Active member of club or organization: Not on file     Attends meetings of clubs or organizations: Not on file     Relationship status: Not on file   Other Topics Concern    Not on file   Social History Narrative    Not on file     Current Outpatient Medications on File Prior to Visit   Medication Sig Dispense Refill    aspirin (ECOTRIN) 81 MG EC tablet Take 1 tablet (81 mg total) by mouth once daily. 30 tablet 0    blood sugar diagnostic Strp Use to check blood sugar three times a day 200 each 11    blood-glucose meter (ACCU-CHEK BRINDA PLUS METER) Misc Use as instructed 1 each 0    clopidogreL (PLAVIX) 75 mg tablet Take 1 tablet (75 mg total) by mouth once daily. 30 tablet 0    insulin glargine (LANTUS) 100 unit/mL injection Inject 20 Units into the skin every evening. 10 mL 1    isosorbide mononitrate (IMDUR) 30 MG 24 hr tablet Take 1 tablet (30 mg total) by mouth once daily. 30 tablet 0    JANUVIA 100 mg Tab Take 1 tablet (100 mg total) by mouth once daily. 30 tablet 0    lancets (ACCU-CHEK SOFTCLIX LANCETS) Misc Use to check blood sugar three times a day 200 each 11    lisinopriL (PRINIVIL,ZESTRIL) 20 MG tablet Take 1 tablet (20 mg total) by mouth once daily. 30 tablet 0    metFORMIN (GLUCOPHAGE) 1000 MG tablet Take 1 tablet (1,000 mg total) by mouth 2 (two) times daily. 60 tablet 0    metoprolol tartrate (LOPRESSOR) 25 MG tablet Take 1 tablet (25 mg total) by mouth 2 (two) times daily. 60 tablet 0    rosuvastatin (CRESTOR) 40 MG Tab Take 1 tablet (40 mg total) by mouth  every evening. 90 tablet 4     No current facility-administered medications on file prior to visit.      Review of patient's allergies indicates:   Allergen Reactions    Beesix        Review of Systems   Constitution: Positive for malaise/fatigue. Negative for chills, diaphoresis, night sweats, weight gain and weight loss.   HENT: Negative for congestion, hoarse voice, sore throat and stridor.    Eyes: Negative for double vision and pain.   Cardiovascular: Positive for dyspnea on exertion. Negative for chest pain, claudication, cyanosis, irregular heartbeat, leg swelling, near-syncope, orthopnea, palpitations, paroxysmal nocturnal dyspnea and syncope.   Respiratory: Negative for cough, hemoptysis, shortness of breath, sleep disturbances due to breathing, snoring, sputum production and wheezing.    Endocrine: Negative for cold intolerance, heat intolerance and polydipsia.   Hematologic/Lymphatic: Negative for bleeding problem. Does not bruise/bleed easily.   Skin: Negative for color change, dry skin and rash.   Musculoskeletal: Negative for joint swelling and muscle cramps.   Gastrointestinal: Negative for bloating, abdominal pain, constipation, diarrhea, dysphagia, melena, nausea and vomiting.   Genitourinary: Negative for flank pain and urgency.   Neurological: Negative for dizziness, focal weakness, headaches, light-headedness, loss of balance, seizures and weakness.   Psychiatric/Behavioral: Negative for altered mental status and memory loss. The patient is not nervous/anxious.          Objective:     Physical Exam   Constitutional: He is oriented to person, place, and time. He appears well-developed and well-nourished.   HENT:   Head: Normocephalic.   Eyes: Pupils are equal, round, and reactive to light.   Neck: Normal range of motion. No tracheal deviation present.   Cardiovascular: Normal rate, regular rhythm, normal heart sounds and intact distal pulses. Exam reveals no gallop and no friction rub.   No  murmur heard.  Pulses:       Carotid pulses are 2+ on the right side and 2+ on the left side.       Radial pulses are 2+ on the right side and 2+ on the left side.        Femoral pulses are 2+ on the right side and 2+ on the left side.       Popliteal pulses are 2+ on the right side and 2+ on the left side.        Dorsalis pedis pulses are 2+ on the right side and 2+ on the left side.        Posterior tibial pulses are 2+ on the right side and 2+ on the left side.   Pulmonary/Chest: Effort normal and breath sounds normal. No stridor. No respiratory distress. He has no wheezes. He has no rales. He exhibits no tenderness.   Abdominal: He exhibits no distension. There is no abdominal tenderness. There is no rebound.   Musculoskeletal:         General: No tenderness or edema.   Neurological: He is alert and oriented to person, place, and time.   Skin: Skin is warm and dry.     Conclusion nuclear stress test 12/17/2020:         Abnormal myocardial perfusion scan.    Perfusion Defect There is a mild intensity, small sized, fixed defect consistent with scar in the apical and anteroapical wall(s).    Gated perfusion images showed an ejection fraction of 46% at rest.    Gated perfusion images showed an ejection fraction of 41% post stress.    There is mild global hypokinesis at rest and stress.    The EKG portion of this study is negative for ischemia.     A review of the nuclear portion of the test reveals anterior wall ischemia of a mild degree.       Assessment:     1. Chest pain, unspecified type    2. SHANNON on CPAP    3. Coronary artery disease with unstable angina pectoris, unspecified vessel or lesion type, unspecified whether native or transplanted heart    4. Essential hypertension    5. Type 2 diabetes mellitus with hyperglycemia, with long-term current use of insulin    6. History of MI (myocardial infarction)    7. Morbidly obese          Plan:       1.  Chest pain with recurrent angina:  Patient continues  have recurrent angina with exertion.  The plan is to increase his nitrates and will plan on continue all current medications and plan for heart catheterization in the next 2 weeks.  For severe recurring chest discomfort unrelieved with nitroglycerin sublingual he will go to the emergency room.  2.  CAD prior stent:  Will [facility] to get [facility].  3.  Diabetes mellitus:  Blood sugars still elevated will try to improve his blood sugars with diet medication  4.  Sleep apnea:  The patient will continue to use sleep apnea equipment.  5.  Obesity:  The patient has been counseled on trying to lose weight and we will try to do this after catheterization/angioplasty is done if necessary.  All questions were answered and the patient is set up for heart catheterization in the next 2 weeks with Dr. Raphael.  Blood tests ordered and consent will be signed today.

## 2021-01-01 ENCOUNTER — TELEPHONE (OUTPATIENT)
Dept: CARDIOTHORACIC SURGERY | Facility: CLINIC | Age: 51
End: 2021-01-01

## 2021-01-01 ENCOUNTER — PATIENT OUTREACH (OUTPATIENT)
Dept: ADMINISTRATIVE | Facility: OTHER | Age: 51
End: 2021-01-01

## 2021-01-01 ENCOUNTER — HOSPITAL ENCOUNTER (INPATIENT)
Facility: HOSPITAL | Age: 51
LOS: 5 days | DRG: 235 | End: 2021-03-01
Attending: THORACIC SURGERY (CARDIOTHORACIC VASCULAR SURGERY) | Admitting: THORACIC SURGERY (CARDIOTHORACIC VASCULAR SURGERY)
Payer: MEDICARE

## 2021-01-01 ENCOUNTER — HOSPITAL ENCOUNTER (OUTPATIENT)
Dept: RADIOLOGY | Facility: HOSPITAL | Age: 51
Discharge: HOME | End: 2021-02-18
Attending: NURSE PRACTITIONER
Payer: MEDICARE

## 2021-01-01 ENCOUNTER — TELEPHONE (OUTPATIENT)
Dept: CARDIOTHORACIC SURGERY | Facility: HOSPITAL | Age: 51
End: 2021-01-01

## 2021-01-01 ENCOUNTER — LAB VISIT (OUTPATIENT)
Dept: OTOLARYNGOLOGY | Facility: CLINIC | Age: 51
End: 2021-01-01
Payer: MEDICARE

## 2021-01-01 ENCOUNTER — OFFICE VISIT (OUTPATIENT)
Dept: OPHTHALMOLOGY | Facility: CLINIC | Age: 51
End: 2021-01-01
Payer: MEDICARE

## 2021-01-01 ENCOUNTER — OFFICE VISIT (OUTPATIENT)
Dept: CARDIOTHORACIC SURGERY | Facility: CLINIC | Age: 51
End: 2021-01-01
Payer: MEDICARE

## 2021-01-01 ENCOUNTER — SURGICAL CONSULT (OUTPATIENT)
Dept: CARDIOTHORACIC SURGERY | Facility: CLINIC | Age: 51
End: 2021-01-01
Payer: MEDICARE

## 2021-01-01 ENCOUNTER — TELEPHONE (OUTPATIENT)
Dept: INTERNAL MEDICINE | Facility: CLINIC | Age: 51
End: 2021-01-01

## 2021-01-01 ENCOUNTER — ANESTHESIA EVENT (OUTPATIENT)
Dept: SURGERY | Facility: HOSPITAL | Age: 51
DRG: 235 | End: 2021-01-01
Payer: MEDICARE

## 2021-01-01 ENCOUNTER — ANESTHESIA (OUTPATIENT)
Dept: SURGERY | Facility: HOSPITAL | Age: 51
DRG: 235 | End: 2021-01-01
Payer: MEDICARE

## 2021-01-01 ENCOUNTER — HOSPITAL ENCOUNTER (EMERGENCY)
Facility: HOSPITAL | Age: 51
Discharge: HOME | End: 2021-02-13
Attending: FAMILY MEDICINE
Payer: MEDICARE

## 2021-01-01 ENCOUNTER — HOSPITAL ENCOUNTER (OUTPATIENT)
Facility: HOSPITAL | Age: 51
Discharge: HOME | End: 2021-01-05
Attending: INTERNAL MEDICINE | Admitting: INTERNAL MEDICINE
Payer: MEDICARE

## 2021-01-01 ENCOUNTER — HOSPITAL ENCOUNTER (OUTPATIENT)
Dept: PREADMISSION TESTING | Facility: HOSPITAL | Age: 51
Discharge: HOME | End: 2021-02-17
Attending: THORACIC SURGERY (CARDIOTHORACIC VASCULAR SURGERY) | Admitting: THORACIC SURGERY (CARDIOTHORACIC VASCULAR SURGERY)
Payer: MEDICARE

## 2021-01-01 VITALS
SYSTOLIC BLOOD PRESSURE: 126 MMHG | TEMPERATURE: 99 F | HEART RATE: 88 BPM | DIASTOLIC BLOOD PRESSURE: 80 MMHG | BODY MASS INDEX: 44.79 KG/M2 | WEIGHT: 312.19 LBS

## 2021-01-01 VITALS
HEART RATE: 82 BPM | SYSTOLIC BLOOD PRESSURE: 113 MMHG | RESPIRATION RATE: 19 BRPM | WEIGHT: 311 LBS | DIASTOLIC BLOOD PRESSURE: 65 MMHG | TEMPERATURE: 98 F | HEIGHT: 70 IN | OXYGEN SATURATION: 100 % | BODY MASS INDEX: 44.52 KG/M2

## 2021-01-01 VITALS
BODY MASS INDEX: 45.1 KG/M2 | OXYGEN SATURATION: 96 % | HEIGHT: 70 IN | HEART RATE: 101 BPM | RESPIRATION RATE: 18 BRPM | DIASTOLIC BLOOD PRESSURE: 90 MMHG | SYSTOLIC BLOOD PRESSURE: 140 MMHG | WEIGHT: 315 LBS | TEMPERATURE: 99 F

## 2021-01-01 VITALS
TEMPERATURE: 99 F | HEART RATE: 89 BPM | WEIGHT: 315 LBS | BODY MASS INDEX: 45.2 KG/M2 | SYSTOLIC BLOOD PRESSURE: 128 MMHG | DIASTOLIC BLOOD PRESSURE: 84 MMHG

## 2021-01-01 VITALS
TEMPERATURE: 99 F | BODY MASS INDEX: 45.2 KG/M2 | SYSTOLIC BLOOD PRESSURE: 153 MMHG | HEART RATE: 95 BPM | DIASTOLIC BLOOD PRESSURE: 108 MMHG | WEIGHT: 315 LBS

## 2021-01-01 VITALS
BODY MASS INDEX: 45.07 KG/M2 | WEIGHT: 314.81 LBS | TEMPERATURE: 97 F | HEIGHT: 70 IN | RESPIRATION RATE: 20 BRPM | OXYGEN SATURATION: 80 % | SYSTOLIC BLOOD PRESSURE: 116 MMHG | HEART RATE: 104 BPM | DIASTOLIC BLOOD PRESSURE: 61 MMHG

## 2021-01-01 DIAGNOSIS — I25.10 CORONARY ARTERY DISEASE, ANGINA PRESENCE UNSPECIFIED, UNSPECIFIED VESSEL OR LESION TYPE, UNSPECIFIED WHETHER NATIVE OR TRANSPLANTED HEART: ICD-10-CM

## 2021-01-01 DIAGNOSIS — E66.01 MORBIDLY OBESE: ICD-10-CM

## 2021-01-01 DIAGNOSIS — R07.9 CHEST PAIN, UNSPECIFIED TYPE: ICD-10-CM

## 2021-01-01 DIAGNOSIS — E11.65 TYPE 2 DIABETES MELLITUS WITH HYPERGLYCEMIA, WITH LONG-TERM CURRENT USE OF INSULIN: ICD-10-CM

## 2021-01-01 DIAGNOSIS — E11.59 HYPERTENSION ASSOCIATED WITH DIABETES: Primary | Chronic | ICD-10-CM

## 2021-01-01 DIAGNOSIS — E11.65 TYPE 2 DIABETES MELLITUS WITH HYPERGLYCEMIA, WITH LONG-TERM CURRENT USE OF INSULIN: Chronic | ICD-10-CM

## 2021-01-01 DIAGNOSIS — G47.33 OSA ON CPAP: Chronic | ICD-10-CM

## 2021-01-01 DIAGNOSIS — I25.2 HISTORY OF MI (MYOCARDIAL INFARCTION): Chronic | ICD-10-CM

## 2021-01-01 DIAGNOSIS — Z79.4 TYPE 2 DIABETES MELLITUS WITH HYPERGLYCEMIA, WITH LONG-TERM CURRENT USE OF INSULIN: Chronic | ICD-10-CM

## 2021-01-01 DIAGNOSIS — I25.10 MULTIPLE VESSEL CORONARY ARTERY DISEASE: ICD-10-CM

## 2021-01-01 DIAGNOSIS — I15.2 HYPERTENSION ASSOCIATED WITH DIABETES: Primary | Chronic | ICD-10-CM

## 2021-01-01 DIAGNOSIS — E78.5 HYPERLIPIDEMIA ASSOCIATED WITH TYPE 2 DIABETES MELLITUS: Chronic | ICD-10-CM

## 2021-01-01 DIAGNOSIS — I25.2 HISTORY OF MI (MYOCARDIAL INFARCTION): ICD-10-CM

## 2021-01-01 DIAGNOSIS — R00.0 TACHYCARDIA: ICD-10-CM

## 2021-01-01 DIAGNOSIS — Z95.1 S/P CABG X 5: ICD-10-CM

## 2021-01-01 DIAGNOSIS — I25.118 CORONARY ARTERY DISEASE OF NATIVE ARTERY OF NATIVE HEART WITH STABLE ANGINA PECTORIS: Chronic | ICD-10-CM

## 2021-01-01 DIAGNOSIS — I25.10 CAD, MULTIPLE VESSEL: Primary | ICD-10-CM

## 2021-01-01 DIAGNOSIS — H60.501 ACUTE OTITIS EXTERNA OF RIGHT EAR, UNSPECIFIED TYPE: Primary | ICD-10-CM

## 2021-01-01 DIAGNOSIS — I10 ESSENTIAL HYPERTENSION: ICD-10-CM

## 2021-01-01 DIAGNOSIS — I25.10 MULTIPLE VESSEL CORONARY ARTERY DISEASE: Primary | ICD-10-CM

## 2021-01-01 DIAGNOSIS — E66.01 MORBID OBESITY WITH BODY MASS INDEX OF 40.0-44.9 IN ADULT: Chronic | ICD-10-CM

## 2021-01-01 DIAGNOSIS — G47.33 OSA ON CPAP: ICD-10-CM

## 2021-01-01 DIAGNOSIS — H04.123 DRY EYES, BILATERAL: ICD-10-CM

## 2021-01-01 DIAGNOSIS — E11.69 HYPERLIPIDEMIA ASSOCIATED WITH TYPE 2 DIABETES MELLITUS: Chronic | ICD-10-CM

## 2021-01-01 DIAGNOSIS — E11.59 HYPERTENSION ASSOCIATED WITH DIABETES: Chronic | ICD-10-CM

## 2021-01-01 DIAGNOSIS — Z79.4 TYPE 2 DIABETES MELLITUS WITH HYPERGLYCEMIA, WITH LONG-TERM CURRENT USE OF INSULIN: ICD-10-CM

## 2021-01-01 DIAGNOSIS — Z98.890 POST-OPERATIVE STATE: ICD-10-CM

## 2021-01-01 DIAGNOSIS — Z99.11 ON MECHANICALLY ASSISTED VENTILATION: ICD-10-CM

## 2021-01-01 DIAGNOSIS — I15.2 HYPERTENSION ASSOCIATED WITH DIABETES: Chronic | ICD-10-CM

## 2021-01-01 DIAGNOSIS — I25.110 CORONARY ARTERY DISEASE WITH UNSTABLE ANGINA PECTORIS, UNSPECIFIED VESSEL OR LESION TYPE, UNSPECIFIED WHETHER NATIVE OR TRANSPLANTED HEART: ICD-10-CM

## 2021-01-01 DIAGNOSIS — E11.9 TYPE 2 DIABETES MELLITUS WITHOUT COMPLICATION, WITHOUT LONG-TERM CURRENT USE OF INSULIN: Primary | ICD-10-CM

## 2021-01-01 LAB
ALLENS TEST: ABNORMAL
ANION GAP SERPL CALC-SCNC: 10 MMOL/L (ref 8–16)
ANION GAP SERPL CALC-SCNC: 11 MMOL/L (ref 8–16)
ANION GAP SERPL CALC-SCNC: 12 MMOL/L (ref 8–16)
ANION GAP SERPL CALC-SCNC: 6 MMOL/L (ref 8–16)
ANION GAP SERPL CALC-SCNC: 8 MMOL/L (ref 8–16)
APTT BLDCRRT: 24.9 SEC (ref 21–32)
APTT BLDCRRT: 25 SEC (ref 21–32)
APTT BLDCRRT: <21 SEC (ref 21–32)
BASOPHILS # BLD AUTO: 0.01 K/UL (ref 0–0.2)
BASOPHILS # BLD AUTO: 0.02 K/UL (ref 0–0.2)
BASOPHILS # BLD AUTO: 0.04 K/UL (ref 0–0.2)
BASOPHILS # BLD AUTO: 0.05 K/UL (ref 0–0.2)
BASOPHILS # BLD AUTO: 0.06 K/UL (ref 0–0.2)
BASOPHILS NFR BLD: 0.1 % (ref 0–1.9)
BASOPHILS NFR BLD: 0.2 % (ref 0–1.9)
BASOPHILS NFR BLD: 0.3 % (ref 0–1.9)
BASOPHILS NFR BLD: 0.4 % (ref 0–1.9)
BASOPHILS NFR BLD: 0.4 % (ref 0–1.9)
BUN SERPL-MCNC: 20 MG/DL (ref 6–20)
BUN SERPL-MCNC: 21 MG/DL (ref 6–20)
BUN SERPL-MCNC: 23 MG/DL (ref 6–20)
BUN SERPL-MCNC: 24 MG/DL (ref 6–20)
BUN SERPL-MCNC: 24 MG/DL (ref 6–20)
BUN SERPL-MCNC: 29 MG/DL (ref 6–20)
BUN SERPL-MCNC: 30 MG/DL (ref 6–20)
BUN SERPL-MCNC: 31 MG/DL (ref 6–20)
CALCIUM SERPL-MCNC: 7.2 MG/DL (ref 8.7–10.5)
CALCIUM SERPL-MCNC: 8.3 MG/DL (ref 8.7–10.5)
CALCIUM SERPL-MCNC: 8.3 MG/DL (ref 8.7–10.5)
CALCIUM SERPL-MCNC: 8.5 MG/DL (ref 8.7–10.5)
CALCIUM SERPL-MCNC: 8.6 MG/DL (ref 8.7–10.5)
CALCIUM SERPL-MCNC: 8.7 MG/DL (ref 8.7–10.5)
CALCIUM SERPL-MCNC: 8.8 MG/DL (ref 8.7–10.5)
CALCIUM SERPL-MCNC: 9.1 MG/DL (ref 8.7–10.5)
CALCIUM SERPL-MCNC: 9.2 MG/DL (ref 8.7–10.5)
CALCIUM SERPL-MCNC: 9.2 MG/DL (ref 8.7–10.5)
CALCIUM SERPL-MCNC: 9.5 MG/DL (ref 8.7–10.5)
CATH EF QUANTITATIVE: 50 %
CHLORIDE SERPL-SCNC: 101 MMOL/L (ref 95–110)
CHLORIDE SERPL-SCNC: 107 MMOL/L (ref 95–110)
CHLORIDE SERPL-SCNC: 108 MMOL/L (ref 95–110)
CHLORIDE SERPL-SCNC: 112 MMOL/L (ref 95–110)
CHLORIDE SERPL-SCNC: 112 MMOL/L (ref 95–110)
CHLORIDE SERPL-SCNC: 91 MMOL/L (ref 95–110)
CHLORIDE SERPL-SCNC: 92 MMOL/L (ref 95–110)
CHLORIDE SERPL-SCNC: 92 MMOL/L (ref 95–110)
CHLORIDE SERPL-SCNC: 93 MMOL/L (ref 95–110)
CHLORIDE SERPL-SCNC: 94 MMOL/L (ref 95–110)
CHLORIDE SERPL-SCNC: 95 MMOL/L (ref 95–110)
CO2 SERPL-SCNC: 19 MMOL/L (ref 23–29)
CO2 SERPL-SCNC: 21 MMOL/L (ref 23–29)
CO2 SERPL-SCNC: 21 MMOL/L (ref 23–29)
CO2 SERPL-SCNC: 23 MMOL/L (ref 23–29)
CO2 SERPL-SCNC: 24 MMOL/L (ref 23–29)
CO2 SERPL-SCNC: 26 MMOL/L (ref 23–29)
CO2 SERPL-SCNC: 29 MMOL/L (ref 23–29)
CO2 SERPL-SCNC: 29 MMOL/L (ref 23–29)
CO2 SERPL-SCNC: 30 MMOL/L (ref 23–29)
CREAT SERPL-MCNC: 0.8 MG/DL (ref 0.5–1.4)
CREAT SERPL-MCNC: 0.9 MG/DL (ref 0.5–1.4)
CREAT SERPL-MCNC: 1.1 MG/DL (ref 0.5–1.4)
CREAT SERPL-MCNC: 1.2 MG/DL (ref 0.5–1.4)
CREAT SERPL-MCNC: 1.3 MG/DL (ref 0.5–1.4)
DELSYS: ABNORMAL
DIFFERENTIAL METHOD: ABNORMAL
EOSINOPHIL # BLD AUTO: 0 K/UL (ref 0–0.5)
EOSINOPHIL # BLD AUTO: 0 K/UL (ref 0–0.5)
EOSINOPHIL # BLD AUTO: 0.1 K/UL (ref 0–0.5)
EOSINOPHIL # BLD AUTO: 0.2 K/UL (ref 0–0.5)
EOSINOPHIL # BLD AUTO: 0.2 K/UL (ref 0–0.5)
EOSINOPHIL NFR BLD: 0 % (ref 0–8)
EOSINOPHIL NFR BLD: 0 % (ref 0–8)
EOSINOPHIL NFR BLD: 0.3 % (ref 0–8)
EOSINOPHIL NFR BLD: 0.5 % (ref 0–8)
EOSINOPHIL NFR BLD: 0.6 % (ref 0–8)
EOSINOPHIL NFR BLD: 1.6 % (ref 0–8)
EOSINOPHIL NFR BLD: 1.8 % (ref 0–8)
ERYTHROCYTE [DISTWIDTH] IN BLOOD BY AUTOMATED COUNT: 12.3 % (ref 11.5–14.5)
ERYTHROCYTE [DISTWIDTH] IN BLOOD BY AUTOMATED COUNT: 12.4 % (ref 11.5–14.5)
ERYTHROCYTE [DISTWIDTH] IN BLOOD BY AUTOMATED COUNT: 13 % (ref 11.5–14.5)
ERYTHROCYTE [DISTWIDTH] IN BLOOD BY AUTOMATED COUNT: 13 % (ref 11.5–14.5)
ERYTHROCYTE [DISTWIDTH] IN BLOOD BY AUTOMATED COUNT: 13.1 % (ref 11.5–14.5)
ERYTHROCYTE [DISTWIDTH] IN BLOOD BY AUTOMATED COUNT: 13.2 % (ref 11.5–14.5)
ERYTHROCYTE [DISTWIDTH] IN BLOOD BY AUTOMATED COUNT: 13.2 % (ref 11.5–14.5)
ERYTHROCYTE [DISTWIDTH] IN BLOOD BY AUTOMATED COUNT: 13.4 % (ref 11.5–14.5)
ERYTHROCYTE [SEDIMENTATION RATE] IN BLOOD BY WESTERGREN METHOD: 20 MM/H
ERYTHROCYTE [SEDIMENTATION RATE] IN BLOOD BY WESTERGREN METHOD: 24 MM/H
EST. GFR  (AFRICAN AMERICAN): >60 ML/MIN/1.73 M^2
EST. GFR  (NON AFRICAN AMERICAN): >60 ML/MIN/1.73 M^2
FIBRINOGEN PPP-MCNC: 231 MG/DL (ref 182–366)
FIBRINOGEN PPP-MCNC: 254 MG/DL (ref 182–366)
FIO2: 100
FIO2: 100
FIO2: 30
GLUCOSE SERPL-MCNC: 136 MG/DL (ref 70–110)
GLUCOSE SERPL-MCNC: 142 MG/DL (ref 70–110)
GLUCOSE SERPL-MCNC: 143 MG/DL (ref 70–110)
GLUCOSE SERPL-MCNC: 143 MG/DL (ref 70–110)
GLUCOSE SERPL-MCNC: 170 MG/DL (ref 70–110)
GLUCOSE SERPL-MCNC: 173 MG/DL (ref 70–110)
GLUCOSE SERPL-MCNC: 188 MG/DL (ref 70–110)
GLUCOSE SERPL-MCNC: 202 MG/DL (ref 70–110)
GLUCOSE SERPL-MCNC: 211 MG/DL (ref 70–110)
GLUCOSE SERPL-MCNC: 218 MG/DL (ref 70–110)
GLUCOSE SERPL-MCNC: 221 MG/DL (ref 70–110)
GLUCOSE SERPL-MCNC: 231 MG/DL (ref 70–110)
GLUCOSE SERPL-MCNC: 241 MG/DL (ref 70–110)
GLUCOSE SERPL-MCNC: 244 MG/DL (ref 70–110)
GLUCOSE SERPL-MCNC: 258 MG/DL (ref 70–110)
GLUCOSE SERPL-MCNC: 266 MG/DL (ref 70–110)
GLUCOSE SERPL-MCNC: 284 MG/DL (ref 70–110)
GLUCOSE SERPL-MCNC: 294 MG/DL (ref 70–110)
GLUCOSE SERPL-MCNC: 310 MG/DL (ref 70–110)
GLUCOSE SERPL-MCNC: 323 MG/DL (ref 70–110)
GLUCOSE SERPL-MCNC: 334 MG/DL (ref 70–110)
GLUCOSE SERPL-MCNC: 373 MG/DL (ref 70–110)
HCO3 UR-SCNC: 20.8 MMOL/L (ref 24–28)
HCO3 UR-SCNC: 22.5 MMOL/L (ref 24–28)
HCO3 UR-SCNC: 23 MMOL/L (ref 24–28)
HCO3 UR-SCNC: 23.2 MMOL/L (ref 24–28)
HCO3 UR-SCNC: 23.7 MMOL/L (ref 24–28)
HCO3 UR-SCNC: 24 MMOL/L (ref 24–28)
HCO3 UR-SCNC: 24.1 MMOL/L (ref 24–28)
HCO3 UR-SCNC: 24.2 MMOL/L (ref 24–28)
HCO3 UR-SCNC: 24.7 MMOL/L (ref 24–28)
HCO3 UR-SCNC: 25.4 MMOL/L (ref 24–28)
HCO3 UR-SCNC: 25.5 MMOL/L (ref 24–28)
HCO3 UR-SCNC: 26.9 MMOL/L (ref 24–28)
HCO3 UR-SCNC: 27.3 MMOL/L (ref 24–28)
HCT VFR BLD AUTO: 32.4 % (ref 40–54)
HCT VFR BLD AUTO: 33.5 % (ref 40–54)
HCT VFR BLD AUTO: 34.2 % (ref 40–54)
HCT VFR BLD AUTO: 34.7 % (ref 40–54)
HCT VFR BLD AUTO: 35.9 % (ref 40–54)
HCT VFR BLD AUTO: 36.1 % (ref 40–54)
HCT VFR BLD AUTO: 37 % (ref 40–54)
HCT VFR BLD AUTO: 37.7 % (ref 40–54)
HCT VFR BLD CALC: 29 %PCV (ref 36–54)
HCT VFR BLD CALC: 30 %PCV (ref 36–54)
HCT VFR BLD CALC: 31 %PCV (ref 36–54)
HCT VFR BLD CALC: 31 %PCV (ref 36–54)
HCT VFR BLD CALC: 32 %PCV (ref 36–54)
HCT VFR BLD CALC: 32 %PCV (ref 36–54)
HCT VFR BLD CALC: 33 %PCV (ref 36–54)
HCT VFR BLD CALC: 39 %PCV (ref 36–54)
HCT VFR BLD CALC: 42 %PCV (ref 36–54)
HGB BLD-MCNC: 10.4 G/DL (ref 14–18)
HGB BLD-MCNC: 10.5 G/DL (ref 14–18)
HGB BLD-MCNC: 11.1 G/DL (ref 14–18)
HGB BLD-MCNC: 11.2 G/DL (ref 14–18)
HGB BLD-MCNC: 11.2 G/DL (ref 14–18)
HGB BLD-MCNC: 11.4 G/DL (ref 14–18)
HGB BLD-MCNC: 11.4 G/DL (ref 14–18)
HGB BLD-MCNC: 11.7 G/DL (ref 14–18)
IMM GRANULOCYTES # BLD AUTO: 0.07 K/UL (ref 0–0.04)
IMM GRANULOCYTES # BLD AUTO: 0.09 K/UL (ref 0–0.04)
IMM GRANULOCYTES # BLD AUTO: 0.14 K/UL (ref 0–0.04)
IMM GRANULOCYTES # BLD AUTO: 0.14 K/UL (ref 0–0.04)
IMM GRANULOCYTES # BLD AUTO: 0.15 K/UL (ref 0–0.04)
IMM GRANULOCYTES NFR BLD AUTO: 0.7 % (ref 0–0.5)
IMM GRANULOCYTES NFR BLD AUTO: 0.9 % (ref 0–0.5)
IMM GRANULOCYTES NFR BLD AUTO: 0.9 % (ref 0–0.5)
IMM GRANULOCYTES NFR BLD AUTO: 1.1 % (ref 0–0.5)
IMM GRANULOCYTES NFR BLD AUTO: 1.1 % (ref 0–0.5)
INR PPP: 1.1 (ref 0.8–1.2)
INR PPP: 1.1 (ref 0.8–1.2)
INR PPP: 1.2 (ref 0.8–1.2)
LYMPHOCYTES # BLD AUTO: 1 K/UL (ref 1–4.8)
LYMPHOCYTES # BLD AUTO: 1.1 K/UL (ref 1–4.8)
LYMPHOCYTES # BLD AUTO: 1.4 K/UL (ref 1–4.8)
LYMPHOCYTES # BLD AUTO: 1.4 K/UL (ref 1–4.8)
LYMPHOCYTES # BLD AUTO: 1.5 K/UL (ref 1–4.8)
LYMPHOCYTES # BLD AUTO: 1.8 K/UL (ref 1–4.8)
LYMPHOCYTES # BLD AUTO: 2.1 K/UL (ref 1–4.8)
LYMPHOCYTES NFR BLD: 10.7 % (ref 18–48)
LYMPHOCYTES NFR BLD: 12.6 % (ref 18–48)
LYMPHOCYTES NFR BLD: 13 % (ref 18–48)
LYMPHOCYTES NFR BLD: 14.1 % (ref 18–48)
LYMPHOCYTES NFR BLD: 5.2 % (ref 18–48)
LYMPHOCYTES NFR BLD: 8.7 % (ref 18–48)
LYMPHOCYTES NFR BLD: 9 % (ref 18–48)
MAGNESIUM SERPL-MCNC: 1.7 MG/DL (ref 1.6–2.6)
MAGNESIUM SERPL-MCNC: 2 MG/DL (ref 1.6–2.6)
MAGNESIUM SERPL-MCNC: 2.1 MG/DL (ref 1.6–2.6)
MAGNESIUM SERPL-MCNC: 2.3 MG/DL (ref 1.6–2.6)
MAGNESIUM SERPL-MCNC: 2.3 MG/DL (ref 1.6–2.6)
MAGNESIUM SERPL-MCNC: 2.4 MG/DL (ref 1.6–2.6)
MAGNESIUM SERPL-MCNC: 3.4 MG/DL (ref 1.6–2.6)
MCH RBC QN AUTO: 27.9 PG (ref 27–31)
MCH RBC QN AUTO: 27.9 PG (ref 27–31)
MCH RBC QN AUTO: 28.3 PG (ref 27–31)
MCH RBC QN AUTO: 28.3 PG (ref 27–31)
MCH RBC QN AUTO: 28.4 PG (ref 27–31)
MCH RBC QN AUTO: 28.6 PG (ref 27–31)
MCH RBC QN AUTO: 28.8 PG (ref 27–31)
MCH RBC QN AUTO: 29.1 PG (ref 27–31)
MCHC RBC AUTO-ENTMCNC: 30.8 G/DL (ref 32–36)
MCHC RBC AUTO-ENTMCNC: 31 G/DL (ref 32–36)
MCHC RBC AUTO-ENTMCNC: 31.2 G/DL (ref 32–36)
MCHC RBC AUTO-ENTMCNC: 31.3 G/DL (ref 32–36)
MCHC RBC AUTO-ENTMCNC: 31.6 G/DL (ref 32–36)
MCHC RBC AUTO-ENTMCNC: 32 G/DL (ref 32–36)
MCHC RBC AUTO-ENTMCNC: 32.1 G/DL (ref 32–36)
MCHC RBC AUTO-ENTMCNC: 32.7 G/DL (ref 32–36)
MCV RBC AUTO: 88 FL (ref 82–98)
MCV RBC AUTO: 89 FL (ref 82–98)
MCV RBC AUTO: 90 FL (ref 82–98)
MCV RBC AUTO: 90 FL (ref 82–98)
MCV RBC AUTO: 91 FL (ref 82–98)
MCV RBC AUTO: 92 FL (ref 82–98)
MODE: ABNORMAL
MONOCYTES # BLD AUTO: 1 K/UL (ref 0.3–1)
MONOCYTES # BLD AUTO: 1.2 K/UL (ref 0.3–1)
MONOCYTES # BLD AUTO: 1.3 K/UL (ref 0.3–1)
MONOCYTES # BLD AUTO: 1.4 K/UL (ref 0.3–1)
MONOCYTES # BLD AUTO: 1.4 K/UL (ref 0.3–1)
MONOCYTES # BLD AUTO: 1.5 K/UL (ref 0.3–1)
MONOCYTES # BLD AUTO: 1.5 K/UL (ref 0.3–1)
MONOCYTES NFR BLD: 10.4 % (ref 4–15)
MONOCYTES NFR BLD: 11.8 % (ref 4–15)
MONOCYTES NFR BLD: 7.4 % (ref 4–15)
MONOCYTES NFR BLD: 8 % (ref 4–15)
MONOCYTES NFR BLD: 8.9 % (ref 4–15)
MONOCYTES NFR BLD: 9.4 % (ref 4–15)
MONOCYTES NFR BLD: 9.6 % (ref 4–15)
NEUTROPHILS # BLD AUTO: 10.1 K/UL (ref 1.8–7.7)
NEUTROPHILS # BLD AUTO: 10.5 K/UL (ref 1.8–7.7)
NEUTROPHILS # BLD AUTO: 10.9 K/UL (ref 1.8–7.7)
NEUTROPHILS # BLD AUTO: 12.2 K/UL (ref 1.8–7.7)
NEUTROPHILS # BLD AUTO: 13.2 K/UL (ref 1.8–7.7)
NEUTROPHILS # BLD AUTO: 16.9 K/UL (ref 1.8–7.7)
NEUTROPHILS # BLD AUTO: 7.1 K/UL (ref 1.8–7.7)
NEUTROPHILS NFR BLD: 72.8 % (ref 38–73)
NEUTROPHILS NFR BLD: 74.7 % (ref 38–73)
NEUTROPHILS NFR BLD: 76 % (ref 38–73)
NEUTROPHILS NFR BLD: 78 % (ref 38–73)
NEUTROPHILS NFR BLD: 78.6 % (ref 38–73)
NEUTROPHILS NFR BLD: 82 % (ref 38–73)
NEUTROPHILS NFR BLD: 86.6 % (ref 38–73)
NRBC BLD-RTO: 0 /100 WBC
PCO2 BLDA: 38.7 MMHG (ref 35–45)
PCO2 BLDA: 39.2 MMHG (ref 35–45)
PCO2 BLDA: 39.3 MMHG (ref 35–45)
PCO2 BLDA: 40 MMHG (ref 35–45)
PCO2 BLDA: 40.2 MMHG (ref 35–45)
PCO2 BLDA: 41.4 MMHG (ref 35–45)
PCO2 BLDA: 43.4 MMHG (ref 35–45)
PCO2 BLDA: 46.3 MMHG (ref 35–45)
PCO2 BLDA: 49.1 MMHG (ref 35–45)
PCO2 BLDA: 51.5 MMHG (ref 35–45)
PCO2 BLDA: 53.1 MMHG (ref 35–45)
PCO2 BLDA: 54.8 MMHG (ref 35–45)
PCO2 BLDA: 57 MMHG (ref 35–45)
PEEP: 5
PH SMN: 7.26 [PH] (ref 7.35–7.45)
PH SMN: 7.26 [PH] (ref 7.35–7.45)
PH SMN: 7.28 [PH] (ref 7.35–7.45)
PH SMN: 7.3 [PH] (ref 7.35–7.45)
PH SMN: 7.33 [PH] (ref 7.35–7.45)
PH SMN: 7.34 [PH] (ref 7.35–7.45)
PH SMN: 7.35 [PH] (ref 7.35–7.45)
PH SMN: 7.36 [PH] (ref 7.35–7.45)
PH SMN: 7.38 [PH] (ref 7.35–7.45)
PH SMN: 7.39 [PH] (ref 7.35–7.45)
PH SMN: 7.4 [PH] (ref 7.35–7.45)
PLATELET # BLD AUTO: 142 K/UL (ref 150–350)
PLATELET # BLD AUTO: 143 K/UL (ref 150–350)
PLATELET # BLD AUTO: 156 K/UL (ref 150–350)
PLATELET # BLD AUTO: 178 K/UL (ref 150–350)
PLATELET # BLD AUTO: 178 K/UL (ref 150–350)
PLATELET # BLD AUTO: 186 K/UL (ref 150–350)
PLATELET # BLD AUTO: 195 K/UL (ref 150–350)
PLATELET # BLD AUTO: 223 K/UL (ref 150–350)
PLATELET BLD QL SMEAR: ABNORMAL
PMV BLD AUTO: 11.4 FL (ref 9.2–12.9)
PMV BLD AUTO: 11.6 FL (ref 9.2–12.9)
PMV BLD AUTO: 11.7 FL (ref 9.2–12.9)
PMV BLD AUTO: 11.8 FL (ref 9.2–12.9)
PMV BLD AUTO: 11.9 FL (ref 9.2–12.9)
PMV BLD AUTO: 11.9 FL (ref 9.2–12.9)
PMV BLD AUTO: 12.2 FL (ref 9.2–12.9)
PMV BLD AUTO: 12.8 FL (ref 9.2–12.9)
PO2 BLDA: 182 MMHG (ref 80–100)
PO2 BLDA: 228 MMHG (ref 80–100)
PO2 BLDA: 232 MMHG (ref 80–100)
PO2 BLDA: 245 MMHG (ref 80–100)
PO2 BLDA: 246 MMHG (ref 80–100)
PO2 BLDA: 247 MMHG (ref 80–100)
PO2 BLDA: 264 MMHG (ref 80–100)
PO2 BLDA: 276 MMHG (ref 80–100)
PO2 BLDA: 349 MMHG (ref 80–100)
PO2 BLDA: 406 MMHG (ref 80–100)
PO2 BLDA: 452 MMHG (ref 80–100)
PO2 BLDA: 500 MMHG (ref 80–100)
PO2 BLDA: 93 MMHG (ref 80–100)
POC ACTIVATED CLOTTING TIME K: 114 SEC (ref 74–137)
POC ACTIVATED CLOTTING TIME K: 125 SEC (ref 74–137)
POC ACTIVATED CLOTTING TIME K: 455 SEC (ref 74–137)
POC ACTIVATED CLOTTING TIME K: 505 SEC (ref 74–137)
POC ACTIVATED CLOTTING TIME K: 538 SEC (ref 74–137)
POC ACTIVATED CLOTTING TIME K: 543 SEC (ref 74–137)
POC ACTIVATED CLOTTING TIME K: 549 SEC (ref 74–137)
POC ACTIVATED CLOTTING TIME K: 588 SEC (ref 74–137)
POC ACTIVATED CLOTTING TIME K: 593 SEC (ref 74–137)
POC ACTIVATED CLOTTING TIME K: 637 SEC (ref 74–137)
POC ACTIVATED CLOTTING TIME K: 643 SEC (ref 74–137)
POC BE: -1 MMOL/L
POC BE: -2 MMOL/L
POC BE: -2 MMOL/L
POC BE: -3 MMOL/L
POC BE: -5 MMOL/L
POC BE: 1 MMOL/L
POC BE: 1 MMOL/L
POC IONIZED CALCIUM: 1.14 MMOL/L (ref 1.06–1.42)
POC IONIZED CALCIUM: 1.14 MMOL/L (ref 1.06–1.42)
POC IONIZED CALCIUM: 1.17 MMOL/L (ref 1.06–1.42)
POC IONIZED CALCIUM: 1.18 MMOL/L (ref 1.06–1.42)
POC IONIZED CALCIUM: 1.18 MMOL/L (ref 1.06–1.42)
POC IONIZED CALCIUM: 1.19 MMOL/L (ref 1.06–1.42)
POC IONIZED CALCIUM: 1.2 MMOL/L (ref 1.06–1.42)
POC IONIZED CALCIUM: 1.2 MMOL/L (ref 1.06–1.42)
POC IONIZED CALCIUM: 1.24 MMOL/L (ref 1.06–1.42)
POC IONIZED CALCIUM: 1.27 MMOL/L (ref 1.06–1.42)
POC IONIZED CALCIUM: 1.31 MMOL/L (ref 1.06–1.42)
POC SATURATED O2: 100 % (ref 95–100)
POC SATURATED O2: 97 % (ref 95–100)
POC SATURATED O2: 99 % (ref 95–100)
POCT GLUCOSE: 105 MG/DL (ref 70–110)
POCT GLUCOSE: 133 MG/DL (ref 70–110)
POCT GLUCOSE: 173 MG/DL (ref 70–110)
POCT GLUCOSE: 182 MG/DL (ref 70–110)
POCT GLUCOSE: 207 MG/DL (ref 70–110)
POCT GLUCOSE: 209 MG/DL (ref 70–110)
POCT GLUCOSE: 211 MG/DL (ref 70–110)
POCT GLUCOSE: 213 MG/DL (ref 70–110)
POCT GLUCOSE: 213 MG/DL (ref 70–110)
POCT GLUCOSE: 214 MG/DL (ref 70–110)
POCT GLUCOSE: 219 MG/DL (ref 70–110)
POCT GLUCOSE: 229 MG/DL (ref 70–110)
POCT GLUCOSE: 244 MG/DL (ref 70–110)
POCT GLUCOSE: 245 MG/DL (ref 70–110)
POCT GLUCOSE: 245 MG/DL (ref 70–110)
POCT GLUCOSE: 251 MG/DL (ref 70–110)
POCT GLUCOSE: 252 MG/DL (ref 70–110)
POCT GLUCOSE: 252 MG/DL (ref 70–110)
POCT GLUCOSE: 253 MG/DL (ref 70–110)
POCT GLUCOSE: 257 MG/DL (ref 70–110)
POCT GLUCOSE: 259 MG/DL (ref 70–110)
POCT GLUCOSE: 264 MG/DL (ref 70–110)
POCT GLUCOSE: 272 MG/DL (ref 70–110)
POCT GLUCOSE: 275 MG/DL (ref 70–110)
POCT GLUCOSE: 293 MG/DL (ref 70–110)
POCT GLUCOSE: 296 MG/DL (ref 70–110)
POCT GLUCOSE: 309 MG/DL (ref 70–110)
POCT GLUCOSE: 310 MG/DL (ref 70–110)
POCT GLUCOSE: 313 MG/DL (ref 70–110)
POCT GLUCOSE: 320 MG/DL (ref 70–110)
POCT GLUCOSE: 323 MG/DL (ref 70–110)
POCT GLUCOSE: 328 MG/DL (ref 70–110)
POCT GLUCOSE: 331 MG/DL (ref 70–110)
POCT GLUCOSE: 340 MG/DL (ref 70–110)
POCT GLUCOSE: 352 MG/DL (ref 70–110)
POCT GLUCOSE: 374 MG/DL (ref 70–110)
POCT GLUCOSE: 385 MG/DL (ref 70–110)
POTASSIUM BLD-SCNC: 3.2 MMOL/L (ref 3.5–5.1)
POTASSIUM BLD-SCNC: 3.7 MMOL/L (ref 3.5–5.1)
POTASSIUM BLD-SCNC: 3.9 MMOL/L (ref 3.5–5.1)
POTASSIUM BLD-SCNC: 4 MMOL/L (ref 3.5–5.1)
POTASSIUM BLD-SCNC: 4.1 MMOL/L (ref 3.5–5.1)
POTASSIUM BLD-SCNC: 4.1 MMOL/L (ref 3.5–5.1)
POTASSIUM BLD-SCNC: 4.2 MMOL/L (ref 3.5–5.1)
POTASSIUM BLD-SCNC: 4.2 MMOL/L (ref 3.5–5.1)
POTASSIUM BLD-SCNC: 4.7 MMOL/L (ref 3.5–5.1)
POTASSIUM BLD-SCNC: 4.9 MMOL/L (ref 3.5–5.1)
POTASSIUM BLD-SCNC: 5.8 MMOL/L (ref 3.5–5.1)
POTASSIUM SERPL-SCNC: 3.3 MMOL/L (ref 3.5–5.1)
POTASSIUM SERPL-SCNC: 3.6 MMOL/L (ref 3.5–5.1)
POTASSIUM SERPL-SCNC: 3.6 MMOL/L (ref 3.5–5.1)
POTASSIUM SERPL-SCNC: 3.7 MMOL/L (ref 3.5–5.1)
POTASSIUM SERPL-SCNC: 3.7 MMOL/L (ref 3.5–5.1)
POTASSIUM SERPL-SCNC: 4.2 MMOL/L (ref 3.5–5.1)
POTASSIUM SERPL-SCNC: 4.2 MMOL/L (ref 3.5–5.1)
POTASSIUM SERPL-SCNC: 4.3 MMOL/L (ref 3.5–5.1)
POTASSIUM SERPL-SCNC: 4.7 MMOL/L (ref 3.5–5.1)
POTASSIUM SERPL-SCNC: 4.8 MMOL/L (ref 3.5–5.1)
POTASSIUM SERPL-SCNC: 5.1 MMOL/L (ref 3.5–5.1)
PROTHROMBIN TIME: 11.7 SEC (ref 9–12.5)
PROTHROMBIN TIME: 12.4 SEC (ref 9–12.5)
PROTHROMBIN TIME: 13.3 SEC (ref 9–12.5)
PS: 7
RBC # BLD AUTO: 3.57 M/UL (ref 4.6–6.2)
RBC # BLD AUTO: 3.7 M/UL (ref 4.6–6.2)
RBC # BLD AUTO: 3.85 M/UL (ref 4.6–6.2)
RBC # BLD AUTO: 3.91 M/UL (ref 4.6–6.2)
RBC # BLD AUTO: 4.01 M/UL (ref 4.6–6.2)
RBC # BLD AUTO: 4.03 M/UL (ref 4.6–6.2)
RBC # BLD AUTO: 4.08 M/UL (ref 4.6–6.2)
RBC # BLD AUTO: 4.14 M/UL (ref 4.6–6.2)
SAMPLE: ABNORMAL
SAMPLE: NORMAL
SAMPLE: NORMAL
SARS-COV-2 RDRP RESP QL NAA+PROBE: NEGATIVE
SARS-COV-2 RNA RESP QL NAA+PROBE: NOT DETECTED
SITE: ABNORMAL
SODIUM BLD-SCNC: 135 MMOL/L (ref 136–145)
SODIUM BLD-SCNC: 136 MMOL/L (ref 136–145)
SODIUM BLD-SCNC: 137 MMOL/L (ref 136–145)
SODIUM BLD-SCNC: 139 MMOL/L (ref 136–145)
SODIUM BLD-SCNC: 140 MMOL/L (ref 136–145)
SODIUM BLD-SCNC: 142 MMOL/L (ref 136–145)
SODIUM BLD-SCNC: 143 MMOL/L (ref 136–145)
SODIUM SERPL-SCNC: 128 MMOL/L (ref 136–145)
SODIUM SERPL-SCNC: 131 MMOL/L (ref 136–145)
SODIUM SERPL-SCNC: 132 MMOL/L (ref 136–145)
SODIUM SERPL-SCNC: 132 MMOL/L (ref 136–145)
SODIUM SERPL-SCNC: 133 MMOL/L (ref 136–145)
SODIUM SERPL-SCNC: 134 MMOL/L (ref 136–145)
SODIUM SERPL-SCNC: 134 MMOL/L (ref 136–145)
SODIUM SERPL-SCNC: 137 MMOL/L (ref 136–145)
SODIUM SERPL-SCNC: 137 MMOL/L (ref 136–145)
SODIUM SERPL-SCNC: 139 MMOL/L (ref 136–145)
SODIUM SERPL-SCNC: 139 MMOL/L (ref 136–145)
VT: 450
VT: 500
WBC # BLD AUTO: 12.95 K/UL (ref 3.9–12.7)
WBC # BLD AUTO: 13.92 K/UL (ref 3.9–12.7)
WBC # BLD AUTO: 14.08 K/UL (ref 3.9–12.7)
WBC # BLD AUTO: 16.03 K/UL (ref 3.9–12.7)
WBC # BLD AUTO: 16.08 K/UL (ref 3.9–12.7)
WBC # BLD AUTO: 18.35 K/UL (ref 3.9–12.7)
WBC # BLD AUTO: 19.5 K/UL (ref 3.9–12.7)
WBC # BLD AUTO: 9.73 K/UL (ref 3.9–12.7)

## 2021-01-01 PROCEDURE — 84132 ASSAY OF SERUM POTASSIUM: CPT

## 2021-01-01 PROCEDURE — 27000190 HC CPAP FULL FACE MASK W/VALVE

## 2021-01-01 PROCEDURE — 27800903 OPTIME MED/SURG SUP & DEVICES OTHER IMPLANTS: Performed by: THORACIC SURGERY (CARDIOTHORACIC VASCULAR SURGERY)

## 2021-01-01 PROCEDURE — C1729 CATH, DRAINAGE: HCPCS | Performed by: THORACIC SURGERY (CARDIOTHORACIC VASCULAR SURGERY)

## 2021-01-01 PROCEDURE — 85027 COMPLETE CBC AUTOMATED: CPT

## 2021-01-01 PROCEDURE — C9399 UNCLASSIFIED DRUGS OR BIOLOG: HCPCS | Performed by: THORACIC SURGERY (CARDIOTHORACIC VASCULAR SURGERY)

## 2021-01-01 PROCEDURE — U0005 INFEC AGEN DETEC AMPLI PROBE: HCPCS

## 2021-01-01 PROCEDURE — 27000221 HC OXYGEN, UP TO 24 HOURS

## 2021-01-01 PROCEDURE — 21400001 HC TELEMETRY ROOM

## 2021-01-01 PROCEDURE — U0003 INFECTIOUS AGENT DETECTION BY NUCLEIC ACID (DNA OR RNA); SEVERE ACUTE RESPIRATORY SYNDROME CORONAVIRUS 2 (SARS-COV-2) (CORONAVIRUS DISEASE [COVID-19]), AMPLIFIED PROBE TECHNIQUE, MAKING USE OF HIGH THROUGHPUT TECHNOLOGIES AS DESCRIBED BY CMS-2020-01-R: HCPCS

## 2021-01-01 PROCEDURE — C9399 UNCLASSIFIED DRUGS OR BIOLOG: HCPCS | Performed by: NURSE PRACTITIONER

## 2021-01-01 PROCEDURE — 3080F DIAST BP >= 90 MM HG: CPT | Mod: CPTII,S$GLB,, | Performed by: THORACIC SURGERY (CARDIOTHORACIC VASCULAR SURGERY)

## 2021-01-01 PROCEDURE — 99900037 HC PT THERAPY SCREENING (STAT)

## 2021-01-01 PROCEDURE — 94799 UNLISTED PULMONARY SVC/PX: CPT

## 2021-01-01 PROCEDURE — 71000028 HC RECOVERY HIGH ACUITY/ PER HOUR

## 2021-01-01 PROCEDURE — C9113 INJ PANTOPRAZOLE SODIUM, VIA: HCPCS | Performed by: NURSE PRACTITIONER

## 2021-01-01 PROCEDURE — 37799 UNLISTED PX VASCULAR SURGERY: CPT

## 2021-01-01 PROCEDURE — 85730 THROMBOPLASTIN TIME PARTIAL: CPT

## 2021-01-01 PROCEDURE — 3008F PR BODY MASS INDEX (BMI) DOCUMENTED: ICD-10-PCS | Mod: CPTII,S$GLB,, | Performed by: THORACIC SURGERY (CARDIOTHORACIC VASCULAR SURGERY)

## 2021-01-01 PROCEDURE — 99999 PR PBB SHADOW E&M-EST. PATIENT-LVL III: ICD-10-PCS | Mod: PBBFAC,,, | Performed by: THORACIC SURGERY (CARDIOTHORACIC VASCULAR SURGERY)

## 2021-01-01 PROCEDURE — 1125F AMNT PAIN NOTED PAIN PRSNT: CPT | Mod: S$GLB,,, | Performed by: THORACIC SURGERY (CARDIOTHORACIC VASCULAR SURGERY)

## 2021-01-01 PROCEDURE — 96372 THER/PROPH/DIAG INJ SC/IM: CPT

## 2021-01-01 PROCEDURE — 92004 COMPRE OPH EXAM NEW PT 1/>: CPT | Mod: S$GLB,,, | Performed by: STUDENT IN AN ORGANIZED HEALTH CARE EDUCATION/TRAINING PROGRAM

## 2021-01-01 PROCEDURE — 80048 BASIC METABOLIC PNL TOTAL CA: CPT

## 2021-01-01 PROCEDURE — 63600175 PHARM REV CODE 636 W HCPCS: Performed by: NURSE PRACTITIONER

## 2021-01-01 PROCEDURE — 99999 PR PBB SHADOW E&M-EST. PATIENT-LVL V: CPT | Mod: PBBFAC,,, | Performed by: THORACIC SURGERY (CARDIOTHORACIC VASCULAR SURGERY)

## 2021-01-01 PROCEDURE — 25000003 PHARM REV CODE 250: Performed by: THORACIC SURGERY (CARDIOTHORACIC VASCULAR SURGERY)

## 2021-01-01 PROCEDURE — 3008F BODY MASS INDEX DOCD: CPT | Mod: CPTII,S$GLB,, | Performed by: THORACIC SURGERY (CARDIOTHORACIC VASCULAR SURGERY)

## 2021-01-01 PROCEDURE — 99215 OFFICE O/P EST HI 40 MIN: CPT | Mod: S$GLB,,, | Performed by: THORACIC SURGERY (CARDIOTHORACIC VASCULAR SURGERY)

## 2021-01-01 PROCEDURE — 99233 SBSQ HOSP IP/OBS HIGH 50: CPT | Mod: 25,,, | Performed by: NURSE PRACTITIONER

## 2021-01-01 PROCEDURE — 99283 EMERGENCY DEPT VISIT LOW MDM: CPT

## 2021-01-01 PROCEDURE — 99213 OFFICE O/P EST LOW 20 MIN: CPT | Mod: S$GLB,,, | Performed by: THORACIC SURGERY (CARDIOTHORACIC VASCULAR SURGERY)

## 2021-01-01 PROCEDURE — 63600175 PHARM REV CODE 636 W HCPCS: Performed by: INTERNAL MEDICINE

## 2021-01-01 PROCEDURE — 99232 SBSQ HOSP IP/OBS MODERATE 35: CPT | Mod: ,,, | Performed by: PHYSICIAN ASSISTANT

## 2021-01-01 PROCEDURE — 31500 INSERT EMERGENCY AIRWAY: CPT

## 2021-01-01 PROCEDURE — 3079F DIAST BP 80-89 MM HG: CPT | Mod: CPTII,S$GLB,, | Performed by: THORACIC SURGERY (CARDIOTHORACIC VASCULAR SURGERY)

## 2021-01-01 PROCEDURE — 27201423 OPTIME MED/SURG SUP & DEVICES STERILE SUPPLY: Performed by: THORACIC SURGERY (CARDIOTHORACIC VASCULAR SURGERY)

## 2021-01-01 PROCEDURE — 85025 COMPLETE CBC W/AUTO DIFF WBC: CPT | Mod: 91

## 2021-01-01 PROCEDURE — 99900035 HC TECH TIME PER 15 MIN (STAT)

## 2021-01-01 PROCEDURE — 25000003 PHARM REV CODE 250: Performed by: NURSE PRACTITIONER

## 2021-01-01 PROCEDURE — S0017 INJECTION, AMINOCAPROIC ACID: HCPCS | Performed by: NURSE ANESTHETIST, CERTIFIED REGISTERED

## 2021-01-01 PROCEDURE — 99232 SBSQ HOSP IP/OBS MODERATE 35: CPT | Mod: ,,, | Performed by: INTERNAL MEDICINE

## 2021-01-01 PROCEDURE — 33533 CABG ARTERIAL SINGLE: CPT | Mod: ,,, | Performed by: THORACIC SURGERY (CARDIOTHORACIC VASCULAR SURGERY)

## 2021-01-01 PROCEDURE — 63600175 PHARM REV CODE 636 W HCPCS: Performed by: THORACIC SURGERY (CARDIOTHORACIC VASCULAR SURGERY)

## 2021-01-01 PROCEDURE — 36000713 HC OR TIME LEV V EA ADD 15 MIN: Performed by: THORACIC SURGERY (CARDIOTHORACIC VASCULAR SURGERY)

## 2021-01-01 PROCEDURE — 99232 PR SUBSEQUENT HOSPITAL CARE,LEVL II: ICD-10-PCS | Mod: ,,, | Performed by: PHYSICIAN ASSISTANT

## 2021-01-01 PROCEDURE — 25000242 PHARM REV CODE 250 ALT 637 W/ HCPCS: Performed by: NURSE PRACTITIONER

## 2021-01-01 PROCEDURE — 37000008 HC ANESTHESIA 1ST 15 MINUTES: Performed by: THORACIC SURGERY (CARDIOTHORACIC VASCULAR SURGERY)

## 2021-01-01 PROCEDURE — 83735 ASSAY OF MAGNESIUM: CPT

## 2021-01-01 PROCEDURE — 3074F SYST BP LT 130 MM HG: CPT | Mod: CPTII,S$GLB,, | Performed by: THORACIC SURGERY (CARDIOTHORACIC VASCULAR SURGERY)

## 2021-01-01 PROCEDURE — 2023F PR DILATED RETINAL EXAM W/O EVID OF RETINOPATHY: ICD-10-PCS | Mod: S$GLB,,, | Performed by: STUDENT IN AN ORGANIZED HEALTH CARE EDUCATION/TRAINING PROGRAM

## 2021-01-01 PROCEDURE — C1887 CATHETER, GUIDING: HCPCS | Performed by: THORACIC SURGERY (CARDIOTHORACIC VASCULAR SURGERY)

## 2021-01-01 PROCEDURE — 83735 ASSAY OF MAGNESIUM: CPT | Mod: 91

## 2021-01-01 PROCEDURE — 3046F HEMOGLOBIN A1C LEVEL >9.0%: CPT | Mod: CPTII,S$GLB,, | Performed by: THORACIC SURGERY (CARDIOTHORACIC VASCULAR SURGERY)

## 2021-01-01 PROCEDURE — 99291 PR CRITICAL CARE, E/M 30-74 MINUTES: ICD-10-PCS | Mod: ,,, | Performed by: NURSE PRACTITIONER

## 2021-01-01 PROCEDURE — 93922 UPR/L XTREMITY ART 2 LEVELS: CPT | Mod: TC

## 2021-01-01 PROCEDURE — C9290 INJ, BUPIVACAINE LIPOSOME: HCPCS | Performed by: THORACIC SURGERY (CARDIOTHORACIC VASCULAR SURGERY)

## 2021-01-01 PROCEDURE — 63600175 PHARM REV CODE 636 W HCPCS: Performed by: FAMILY MEDICINE

## 2021-01-01 PROCEDURE — 99233 PR SUBSEQUENT HOSPITAL CARE,LEVL III: ICD-10-PCS | Mod: 25,,, | Performed by: NURSE PRACTITIONER

## 2021-01-01 PROCEDURE — 80048 BASIC METABOLIC PNL TOTAL CA: CPT | Mod: 91

## 2021-01-01 PROCEDURE — 85025 COMPLETE CBC W/AUTO DIFF WBC: CPT

## 2021-01-01 PROCEDURE — 1126F AMNT PAIN NOTED NONE PRSNT: CPT | Mod: S$GLB,,, | Performed by: THORACIC SURGERY (CARDIOTHORACIC VASCULAR SURGERY)

## 2021-01-01 PROCEDURE — 82330 ASSAY OF CALCIUM: CPT

## 2021-01-01 PROCEDURE — 94660 CPAP INITIATION&MGMT: CPT

## 2021-01-01 PROCEDURE — 63600175 PHARM REV CODE 636 W HCPCS: Performed by: NURSE ANESTHETIST, CERTIFIED REGISTERED

## 2021-01-01 PROCEDURE — 27200667 HC PACEMAKER, TEMPORARY MONITORING, PER SHIFT

## 2021-01-01 PROCEDURE — 33508 ENDOSCOPIC VEIN HARVEST: CPT | Mod: 59,,, | Performed by: THORACIC SURGERY (CARDIOTHORACIC VASCULAR SURGERY)

## 2021-01-01 PROCEDURE — 99204 PR OFFICE/OUTPT VISIT, NEW, LEVL IV, 45-59 MIN: ICD-10-PCS | Mod: S$GLB,,, | Performed by: THORACIC SURGERY (CARDIOTHORACIC VASCULAR SURGERY)

## 2021-01-01 PROCEDURE — 99152 MOD SED SAME PHYS/QHP 5/>YRS: CPT | Mod: ,,, | Performed by: INTERNAL MEDICINE

## 2021-01-01 PROCEDURE — 36620 INSERTION CATHETER ARTERY: CPT

## 2021-01-01 PROCEDURE — 97110 THERAPEUTIC EXERCISES: CPT

## 2021-01-01 PROCEDURE — 3079F PR MOST RECENT DIASTOLIC BLOOD PRESSURE 80-89 MM HG: ICD-10-PCS | Mod: CPTII,S$GLB,, | Performed by: THORACIC SURGERY (CARDIOTHORACIC VASCULAR SURGERY)

## 2021-01-01 PROCEDURE — 82803 BLOOD GASES ANY COMBINATION: CPT

## 2021-01-01 PROCEDURE — C1713 ANCHOR/SCREW BN/BN,TIS/BN: HCPCS | Performed by: THORACIC SURGERY (CARDIOTHORACIC VASCULAR SURGERY)

## 2021-01-01 PROCEDURE — 99213 PR OFFICE/OUTPT VISIT, EST, LEVL III, 20-29 MIN: ICD-10-PCS | Mod: S$GLB,,, | Performed by: THORACIC SURGERY (CARDIOTHORACIC VASCULAR SURGERY)

## 2021-01-01 PROCEDURE — 63600175 PHARM REV CODE 636 W HCPCS

## 2021-01-01 PROCEDURE — 84295 ASSAY OF SERUM SODIUM: CPT

## 2021-01-01 PROCEDURE — 99999 PR PBB SHADOW E&M-EST. PATIENT-LVL V: ICD-10-PCS | Mod: PBBFAC,,, | Performed by: THORACIC SURGERY (CARDIOTHORACIC VASCULAR SURGERY)

## 2021-01-01 PROCEDURE — 99999 PR PBB SHADOW E&M-EST. PATIENT-LVL III: CPT | Mod: PBBFAC,,, | Performed by: THORACIC SURGERY (CARDIOTHORACIC VASCULAR SURGERY)

## 2021-01-01 PROCEDURE — 99291 CRITICAL CARE FIRST HOUR: CPT | Mod: ,,, | Performed by: NURSE PRACTITIONER

## 2021-01-01 PROCEDURE — 25500020 PHARM REV CODE 255: Performed by: THORACIC SURGERY (CARDIOTHORACIC VASCULAR SURGERY)

## 2021-01-01 PROCEDURE — 97116 GAIT TRAINING THERAPY: CPT | Performed by: PHYSICAL THERAPIST

## 2021-01-01 PROCEDURE — 93010 EKG 12-LEAD: ICD-10-PCS | Mod: ,,, | Performed by: INTERNAL MEDICINE

## 2021-01-01 PROCEDURE — C1769 GUIDE WIRE: HCPCS | Performed by: INTERNAL MEDICINE

## 2021-01-01 PROCEDURE — 94002 VENT MGMT INPAT INIT DAY: CPT

## 2021-01-01 PROCEDURE — 94760 N-INVAS EAR/PLS OXIMETRY 1: CPT

## 2021-01-01 PROCEDURE — 27201423 OPTIME MED/SURG SUP & DEVICES STERILE SUPPLY: Performed by: INTERNAL MEDICINE

## 2021-01-01 PROCEDURE — 85610 PROTHROMBIN TIME: CPT

## 2021-01-01 PROCEDURE — 20000000 HC ICU ROOM

## 2021-01-01 PROCEDURE — 3046F PR MOST RECENT HEMOGLOBIN A1C LEVEL > 9.0%: ICD-10-PCS | Mod: CPTII,S$GLB,, | Performed by: THORACIC SURGERY (CARDIOTHORACIC VASCULAR SURGERY)

## 2021-01-01 PROCEDURE — P9045 ALBUMIN (HUMAN), 5%, 250 ML: HCPCS | Mod: JG | Performed by: NURSE PRACTITIONER

## 2021-01-01 PROCEDURE — 94640 AIRWAY INHALATION TREATMENT: CPT

## 2021-01-01 PROCEDURE — 92004 PR EYE EXAM, NEW PATIENT,COMPREHESV: ICD-10-PCS | Mod: S$GLB,,, | Performed by: STUDENT IN AN ORGANIZED HEALTH CARE EDUCATION/TRAINING PROGRAM

## 2021-01-01 PROCEDURE — 1125F PR PAIN SEVERITY QUANTIFIED, PAIN PRESENT: ICD-10-PCS | Mod: S$GLB,,, | Performed by: THORACIC SURGERY (CARDIOTHORACIC VASCULAR SURGERY)

## 2021-01-01 PROCEDURE — 99215 PR OFFICE/OUTPT VISIT, EST, LEVL V, 40-54 MIN: ICD-10-PCS | Mod: S$GLB,,, | Performed by: THORACIC SURGERY (CARDIOTHORACIC VASCULAR SURGERY)

## 2021-01-01 PROCEDURE — 99900017 HC EXTUBATION W/PARAMETERS (STAT)

## 2021-01-01 PROCEDURE — 37000009 HC ANESTHESIA EA ADD 15 MINS: Performed by: THORACIC SURGERY (CARDIOTHORACIC VASCULAR SURGERY)

## 2021-01-01 PROCEDURE — 3074F PR MOST RECENT SYSTOLIC BLOOD PRESSURE < 130 MM HG: ICD-10-PCS | Mod: CPTII,S$GLB,, | Performed by: THORACIC SURGERY (CARDIOTHORACIC VASCULAR SURGERY)

## 2021-01-01 PROCEDURE — 33521 CABG ARTERY-VEIN FOUR: CPT | Mod: ,,, | Performed by: THORACIC SURGERY (CARDIOTHORACIC VASCULAR SURGERY)

## 2021-01-01 PROCEDURE — 3080F PR MOST RECENT DIASTOLIC BLOOD PRESSURE >= 90 MM HG: ICD-10-PCS | Mod: CPTII,S$GLB,, | Performed by: THORACIC SURGERY (CARDIOTHORACIC VASCULAR SURGERY)

## 2021-01-01 PROCEDURE — 33508 PR ENDOSCOPY W/VIDEO-ASST VEIN HARVEST,CABG: ICD-10-PCS | Mod: 59,,, | Performed by: THORACIC SURGERY (CARDIOTHORACIC VASCULAR SURGERY)

## 2021-01-01 PROCEDURE — 27100108

## 2021-01-01 PROCEDURE — 93458 L HRT ARTERY/VENTRICLE ANGIO: CPT | Mod: 26,,, | Performed by: INTERNAL MEDICINE

## 2021-01-01 PROCEDURE — 3077F PR MOST RECENT SYSTOLIC BLOOD PRESSURE >= 140 MM HG: ICD-10-PCS | Mod: CPTII,S$GLB,, | Performed by: THORACIC SURGERY (CARDIOTHORACIC VASCULAR SURGERY)

## 2021-01-01 PROCEDURE — 97166 OT EVAL MOD COMPLEX 45 MIN: CPT | Performed by: PHYSICAL THERAPIST

## 2021-01-01 PROCEDURE — 85730 THROMBOPLASTIN TIME PARTIAL: CPT | Mod: 91

## 2021-01-01 PROCEDURE — 99999 PR PBB SHADOW E&M-EST. PATIENT-LVL III: ICD-10-PCS | Mod: PBBFAC,,, | Performed by: STUDENT IN AN ORGANIZED HEALTH CARE EDUCATION/TRAINING PROGRAM

## 2021-01-01 PROCEDURE — P9045 ALBUMIN (HUMAN), 5%, 250 ML: HCPCS | Mod: JG | Performed by: NURSE ANESTHETIST, CERTIFIED REGISTERED

## 2021-01-01 PROCEDURE — 85610 PROTHROMBIN TIME: CPT | Mod: 91

## 2021-01-01 PROCEDURE — 1126F PR PAIN SEVERITY QUANTIFIED, NO PAIN PRESENT: ICD-10-PCS | Mod: S$GLB,,, | Performed by: THORACIC SURGERY (CARDIOTHORACIC VASCULAR SURGERY)

## 2021-01-01 PROCEDURE — 93458 L HRT ARTERY/VENTRICLE ANGIO: CPT | Performed by: INTERNAL MEDICINE

## 2021-01-01 PROCEDURE — 99222 1ST HOSP IP/OBS MODERATE 55: CPT | Mod: ,,, | Performed by: PHYSICIAN ASSISTANT

## 2021-01-01 PROCEDURE — 99152 MOD SED SAME PHYS/QHP 5/>YRS: CPT | Performed by: INTERNAL MEDICINE

## 2021-01-01 PROCEDURE — 85384 FIBRINOGEN ACTIVITY: CPT

## 2021-01-01 PROCEDURE — 93880 EXTRACRANIAL BILAT STUDY: CPT | Mod: TC

## 2021-01-01 PROCEDURE — 25000003 PHARM REV CODE 250: Performed by: NURSE ANESTHETIST, CERTIFIED REGISTERED

## 2021-01-01 PROCEDURE — 97116 GAIT TRAINING THERAPY: CPT | Mod: CQ

## 2021-01-01 PROCEDURE — 94761 N-INVAS EAR/PLS OXIMETRY MLT: CPT

## 2021-01-01 PROCEDURE — 99233 SBSQ HOSP IP/OBS HIGH 50: CPT | Mod: ,,, | Performed by: NURSE PRACTITIONER

## 2021-01-01 PROCEDURE — 97530 THERAPEUTIC ACTIVITIES: CPT | Performed by: PHYSICAL THERAPIST

## 2021-01-01 PROCEDURE — 99153 MOD SED SAME PHYS/QHP EA: CPT | Performed by: INTERNAL MEDICINE

## 2021-01-01 PROCEDURE — 97530 THERAPEUTIC ACTIVITIES: CPT

## 2021-01-01 PROCEDURE — 2023F DILAT RTA XM W/O RTNOPTHY: CPT | Mod: S$GLB,,, | Performed by: STUDENT IN AN ORGANIZED HEALTH CARE EDUCATION/TRAINING PROGRAM

## 2021-01-01 PROCEDURE — 99233 PR SUBSEQUENT HOSPITAL CARE,LEVL III: ICD-10-PCS | Mod: ,,, | Performed by: NURSE PRACTITIONER

## 2021-01-01 PROCEDURE — C1751 CATH, INF, PER/CENT/MIDLINE: HCPCS

## 2021-01-01 PROCEDURE — 93005 ELECTROCARDIOGRAM TRACING: CPT

## 2021-01-01 PROCEDURE — 27100171 HC OXYGEN HIGH FLOW UP TO 24 HOURS

## 2021-01-01 PROCEDURE — 25000003 PHARM REV CODE 250: Performed by: INTERNAL MEDICINE

## 2021-01-01 PROCEDURE — 33521 PR CABG, ARTERY-VEIN, FOUR: ICD-10-PCS | Mod: ,,, | Performed by: THORACIC SURGERY (CARDIOTHORACIC VASCULAR SURGERY)

## 2021-01-01 PROCEDURE — 97162 PT EVAL MOD COMPLEX 30 MIN: CPT

## 2021-01-01 PROCEDURE — 3077F SYST BP >= 140 MM HG: CPT | Mod: CPTII,S$GLB,, | Performed by: THORACIC SURGERY (CARDIOTHORACIC VASCULAR SURGERY)

## 2021-01-01 PROCEDURE — 99232 PR SUBSEQUENT HOSPITAL CARE,LEVL II: ICD-10-PCS | Mod: ,,, | Performed by: INTERNAL MEDICINE

## 2021-01-01 PROCEDURE — 93458 PR CATH PLACE/CORON ANGIO, IMG SUPER/INTERP,W LEFT HEART VENTRICULOGRAPHY: ICD-10-PCS | Mod: 26,,, | Performed by: INTERNAL MEDICINE

## 2021-01-01 PROCEDURE — 85384 FIBRINOGEN ACTIVITY: CPT | Mod: 91

## 2021-01-01 PROCEDURE — 36569 INSJ PICC 5 YR+ W/O IMAGING: CPT

## 2021-01-01 PROCEDURE — 97116 GAIT TRAINING THERAPY: CPT

## 2021-01-01 PROCEDURE — 99999 PR PBB SHADOW E&M-EST. PATIENT-LVL III: CPT | Mod: PBBFAC,,, | Performed by: STUDENT IN AN ORGANIZED HEALTH CARE EDUCATION/TRAINING PROGRAM

## 2021-01-01 PROCEDURE — 85014 HEMATOCRIT: CPT

## 2021-01-01 PROCEDURE — 36000712 HC OR TIME LEV V 1ST 15 MIN: Performed by: THORACIC SURGERY (CARDIOTHORACIC VASCULAR SURGERY)

## 2021-01-01 PROCEDURE — 93010 ELECTROCARDIOGRAM REPORT: CPT | Mod: ,,, | Performed by: INTERNAL MEDICINE

## 2021-01-01 PROCEDURE — 33533 PR CABG, ARTERIAL, SINGLE: ICD-10-PCS | Mod: ,,, | Performed by: THORACIC SURGERY (CARDIOTHORACIC VASCULAR SURGERY)

## 2021-01-01 PROCEDURE — 99152 PR MOD CONSCIOUS SEDATION, SAME PHYS, 5+ YRS, FIRST 15 MIN: ICD-10-PCS | Mod: ,,, | Performed by: INTERNAL MEDICINE

## 2021-01-01 PROCEDURE — 99204 OFFICE O/P NEW MOD 45 MIN: CPT | Mod: S$GLB,,, | Performed by: THORACIC SURGERY (CARDIOTHORACIC VASCULAR SURGERY)

## 2021-01-01 PROCEDURE — U0002 COVID-19 LAB TEST NON-CDC: HCPCS

## 2021-01-01 PROCEDURE — 25000003 PHARM REV CODE 250: Performed by: FAMILY MEDICINE

## 2021-01-01 PROCEDURE — C1894 INTRO/SHEATH, NON-LASER: HCPCS | Performed by: INTERNAL MEDICINE

## 2021-01-01 PROCEDURE — 99222 PR INITIAL HOSPITAL CARE,LEVL II: ICD-10-PCS | Mod: ,,, | Performed by: PHYSICIAN ASSISTANT

## 2021-01-01 RX ORDER — EPINEPHRINE 0.1 MG/ML
INJECTION INTRAVENOUS CODE/TRAUMA/SEDATION MEDICATION
Status: COMPLETED | OUTPATIENT
Start: 2021-01-01 | End: 2021-01-01

## 2021-01-01 RX ORDER — ASPIRIN 81 MG/1
81 TABLET ORAL DAILY
Status: DISCONTINUED | OUTPATIENT
Start: 2021-01-01 | End: 2021-01-01 | Stop reason: HOSPADM

## 2021-01-01 RX ORDER — ROCURONIUM BROMIDE 10 MG/ML
INJECTION, SOLUTION INTRAVENOUS
Status: DISCONTINUED | OUTPATIENT
Start: 2021-01-01 | End: 2021-01-01

## 2021-01-01 RX ORDER — CHLORHEXIDINE GLUCONATE ORAL RINSE 1.2 MG/ML
10 SOLUTION DENTAL
Status: CANCELLED | OUTPATIENT
Start: 2021-01-01

## 2021-01-01 RX ORDER — CEFAZOLIN SODIUM 1 G/3ML
INJECTION, POWDER, FOR SOLUTION INTRAMUSCULAR; INTRAVENOUS
Status: DISCONTINUED | OUTPATIENT
Start: 2021-01-01 | End: 2021-01-01

## 2021-01-01 RX ORDER — DILTIAZEM HYDROCHLORIDE 60 MG/1
60 TABLET, FILM COATED ORAL EVERY 6 HOURS
Status: DISCONTINUED | OUTPATIENT
Start: 2021-01-01 | End: 2021-01-01

## 2021-01-01 RX ORDER — POLYETHYLENE GLYCOL 3350 17 G/17G
17 POWDER, FOR SOLUTION ORAL DAILY
Status: DISCONTINUED | OUTPATIENT
Start: 2021-01-01 | End: 2021-01-01

## 2021-01-01 RX ORDER — RANOLAZINE 500 MG/1
500 TABLET, EXTENDED RELEASE ORAL 2 TIMES DAILY
Qty: 60 TABLET | Refills: 11 | Status: SHIPPED | OUTPATIENT
Start: 2021-01-01 | End: 2022-01-05

## 2021-01-01 RX ORDER — GLUCAGON 1 MG
1 KIT INJECTION
Status: DISCONTINUED | OUTPATIENT
Start: 2021-01-01 | End: 2021-01-01 | Stop reason: HOSPADM

## 2021-01-01 RX ORDER — PANTOPRAZOLE SODIUM 40 MG/10ML
40 INJECTION, POWDER, LYOPHILIZED, FOR SOLUTION INTRAVENOUS DAILY
Status: DISCONTINUED | OUTPATIENT
Start: 2021-01-01 | End: 2021-01-01 | Stop reason: HOSPADM

## 2021-01-01 RX ORDER — HYDROCODONE BITARTRATE AND ACETAMINOPHEN 500; 5 MG/1; MG/1
TABLET ORAL
Status: CANCELLED | OUTPATIENT
Start: 2021-01-01

## 2021-01-01 RX ORDER — ONDANSETRON 8 MG/1
8 TABLET, ORALLY DISINTEGRATING ORAL EVERY 8 HOURS PRN
Status: DISCONTINUED | OUTPATIENT
Start: 2021-01-01 | End: 2021-01-01 | Stop reason: HOSPADM

## 2021-01-01 RX ORDER — MAGNESIUM SULFATE HEPTAHYDRATE 40 MG/ML
2 INJECTION, SOLUTION INTRAVENOUS
Status: DISCONTINUED | OUTPATIENT
Start: 2021-01-01 | End: 2021-01-01 | Stop reason: HOSPADM

## 2021-01-01 RX ORDER — IBUPROFEN 200 MG
24 TABLET ORAL
Status: DISCONTINUED | OUTPATIENT
Start: 2021-01-01 | End: 2021-01-01 | Stop reason: HOSPADM

## 2021-01-01 RX ORDER — POTASSIUM CHLORIDE 20 MEQ/1
20 TABLET, EXTENDED RELEASE ORAL 2 TIMES DAILY
Status: DISCONTINUED | OUTPATIENT
Start: 2021-01-01 | End: 2021-01-01 | Stop reason: HOSPADM

## 2021-01-01 RX ORDER — PROPOFOL 10 MG/ML
VIAL (ML) INTRAVENOUS
Status: DISCONTINUED | OUTPATIENT
Start: 2021-01-01 | End: 2021-01-01

## 2021-01-01 RX ORDER — FENTANYL CITRATE 50 UG/ML
25 INJECTION, SOLUTION INTRAMUSCULAR; INTRAVENOUS
Status: DISCONTINUED | OUTPATIENT
Start: 2021-01-01 | End: 2021-01-01

## 2021-01-01 RX ORDER — HYDRALAZINE HYDROCHLORIDE 25 MG/1
25 TABLET, FILM COATED ORAL EVERY 6 HOURS PRN
Status: DISCONTINUED | OUTPATIENT
Start: 2021-01-01 | End: 2021-01-01

## 2021-01-01 RX ORDER — BUPIVACAINE HYDROCHLORIDE 2.5 MG/ML
INJECTION, SOLUTION EPIDURAL; INFILTRATION; INTRACAUDAL
Status: DISCONTINUED | OUTPATIENT
Start: 2021-01-01 | End: 2021-01-01 | Stop reason: HOSPADM

## 2021-01-01 RX ORDER — CALCIUM CHLORIDE INJECTION 100 MG/ML
INJECTION, SOLUTION INTRAVENOUS CODE/TRAUMA/SEDATION MEDICATION
Status: COMPLETED | OUTPATIENT
Start: 2021-01-01 | End: 2021-01-01

## 2021-01-01 RX ORDER — AMIODARONE HYDROCHLORIDE 200 MG/1
200 TABLET ORAL ONCE
Status: COMPLETED | OUTPATIENT
Start: 2021-01-01 | End: 2021-01-01

## 2021-01-01 RX ORDER — POTASSIUM CHLORIDE 14.9 MG/ML
40 INJECTION INTRAVENOUS
Status: DISCONTINUED | OUTPATIENT
Start: 2021-01-01 | End: 2021-01-01 | Stop reason: HOSPADM

## 2021-01-01 RX ORDER — PAPAVERINE HYDROCHLORIDE 30 MG/ML
INJECTION INTRAMUSCULAR; INTRAVENOUS
Status: DISCONTINUED | OUTPATIENT
Start: 2021-01-01 | End: 2021-01-01 | Stop reason: HOSPADM

## 2021-01-01 RX ORDER — VASOPRESSIN 20 [USP'U]/ML
40 INJECTION, SOLUTION INTRAMUSCULAR; SUBCUTANEOUS ONCE
Status: COMPLETED | OUTPATIENT
Start: 2021-01-01 | End: 2021-01-01

## 2021-01-01 RX ORDER — FERROUS SULFATE 325(65) MG
325 TABLET, DELAYED RELEASE (ENTERIC COATED) ORAL 2 TIMES DAILY
Status: DISCONTINUED | OUTPATIENT
Start: 2021-01-01 | End: 2021-01-01 | Stop reason: HOSPADM

## 2021-01-01 RX ORDER — ALBUMIN HUMAN 50 G/1000ML
SOLUTION INTRAVENOUS CONTINUOUS PRN
Status: DISCONTINUED | OUTPATIENT
Start: 2021-01-01 | End: 2021-01-01

## 2021-01-01 RX ORDER — ONDANSETRON 2 MG/ML
INJECTION INTRAMUSCULAR; INTRAVENOUS
Status: DISCONTINUED | OUTPATIENT
Start: 2021-01-01 | End: 2021-01-01

## 2021-01-01 RX ORDER — HEPARIN SODIUM 1000 [USP'U]/ML
INJECTION, SOLUTION INTRAVENOUS; SUBCUTANEOUS
Status: DISCONTINUED | OUTPATIENT
Start: 2021-01-01 | End: 2021-01-01 | Stop reason: HOSPADM

## 2021-01-01 RX ORDER — OXYCODONE HYDROCHLORIDE 5 MG/1
5 TABLET ORAL EVERY 4 HOURS PRN
Status: DISCONTINUED | OUTPATIENT
Start: 2021-01-01 | End: 2021-01-01

## 2021-01-01 RX ORDER — CYANOCOBALAMIN (VITAMIN B-12) 250 MCG
1000 TABLET ORAL DAILY
Status: DISCONTINUED | OUTPATIENT
Start: 2021-01-01 | End: 2021-01-01 | Stop reason: HOSPADM

## 2021-01-01 RX ORDER — DILTIAZEM HYDROCHLORIDE 30 MG/1
30 TABLET, FILM COATED ORAL ONCE
Status: COMPLETED | OUTPATIENT
Start: 2021-01-01 | End: 2021-01-01

## 2021-01-01 RX ORDER — POTASSIUM CHLORIDE 20 MEQ/1
40 TABLET, EXTENDED RELEASE ORAL ONCE
Status: COMPLETED | OUTPATIENT
Start: 2021-01-01 | End: 2021-01-01

## 2021-01-01 RX ORDER — FENTANYL CITRATE 50 UG/ML
INJECTION, SOLUTION INTRAMUSCULAR; INTRAVENOUS
Status: DISCONTINUED | OUTPATIENT
Start: 2021-01-01 | End: 2021-01-01 | Stop reason: HOSPADM

## 2021-01-01 RX ORDER — HEPARIN SODIUM 1000 [USP'U]/ML
INJECTION, SOLUTION INTRAVENOUS; SUBCUTANEOUS
Status: DISCONTINUED | OUTPATIENT
Start: 2021-01-01 | End: 2021-01-01

## 2021-01-01 RX ORDER — DEXMEDETOMIDINE HYDROCHLORIDE 100 UG/ML
INJECTION, SOLUTION INTRAVENOUS CONTINUOUS PRN
Status: DISCONTINUED | OUTPATIENT
Start: 2021-01-01 | End: 2021-01-01

## 2021-01-01 RX ORDER — FENTANYL CITRATE 50 UG/ML
50 INJECTION, SOLUTION INTRAMUSCULAR; INTRAVENOUS
Status: DISCONTINUED | OUTPATIENT
Start: 2021-01-01 | End: 2021-01-01

## 2021-01-01 RX ORDER — MAGNESIUM SULFATE 1 G/100ML
1 INJECTION INTRAVENOUS
Status: DISCONTINUED | OUTPATIENT
Start: 2021-01-01 | End: 2021-01-01 | Stop reason: HOSPADM

## 2021-01-01 RX ORDER — SYRING-NEEDL,DISP,INSUL,0.3 ML 29 G X1/2"
296 SYRINGE, EMPTY DISPOSABLE MISCELLANEOUS ONCE
Status: COMPLETED | OUTPATIENT
Start: 2021-01-01 | End: 2021-01-01

## 2021-01-01 RX ORDER — MAGNESIUM SULFATE HEPTAHYDRATE 40 MG/ML
2 INJECTION, SOLUTION INTRAVENOUS ONCE
Status: COMPLETED | OUTPATIENT
Start: 2021-01-01 | End: 2021-01-01

## 2021-01-01 RX ORDER — LOSARTAN POTASSIUM 50 MG/1
50 TABLET ORAL DAILY
Status: DISCONTINUED | OUTPATIENT
Start: 2021-01-01 | End: 2021-01-01 | Stop reason: HOSPADM

## 2021-01-01 RX ORDER — ACETAMINOPHEN 10 MG/ML
1000 INJECTION, SOLUTION INTRAVENOUS ONCE
Status: COMPLETED | OUTPATIENT
Start: 2021-01-01 | End: 2021-01-01

## 2021-01-01 RX ORDER — HYDRALAZINE HYDROCHLORIDE 50 MG/1
50 TABLET, FILM COATED ORAL EVERY 6 HOURS PRN
Status: DISCONTINUED | OUTPATIENT
Start: 2021-01-01 | End: 2021-01-01 | Stop reason: HOSPADM

## 2021-01-01 RX ORDER — METOPROLOL TARTRATE 25 MG/1
25 TABLET ORAL
Status: CANCELLED | OUTPATIENT
Start: 2021-01-01

## 2021-01-01 RX ORDER — DILTIAZEM HYDROCHLORIDE 240 MG/1
240 CAPSULE, COATED, EXTENDED RELEASE ORAL DAILY
Status: DISCONTINUED | OUTPATIENT
Start: 2021-01-01 | End: 2021-01-01

## 2021-01-01 RX ORDER — SODIUM CHLORIDE, SODIUM LACTATE, POTASSIUM CHLORIDE, CALCIUM CHLORIDE 600; 310; 30; 20 MG/100ML; MG/100ML; MG/100ML; MG/100ML
500 INJECTION, SOLUTION INTRAVENOUS
Status: DISCONTINUED | OUTPATIENT
Start: 2021-01-01 | End: 2021-01-01 | Stop reason: HOSPADM

## 2021-01-01 RX ORDER — SUCCINYLCHOLINE CHLORIDE 20 MG/ML
INJECTION INTRAMUSCULAR; INTRAVENOUS CODE/TRAUMA/SEDATION MEDICATION
Status: COMPLETED | OUTPATIENT
Start: 2021-01-01 | End: 2021-01-01

## 2021-01-01 RX ORDER — LIDOCAINE HYDROCHLORIDE 20 MG/ML
INJECTION INTRAVENOUS
Status: DISCONTINUED | OUTPATIENT
Start: 2021-01-01 | End: 2021-01-01

## 2021-01-01 RX ORDER — AMIODARONE HYDROCHLORIDE 200 MG/1
200 TABLET ORAL ONCE
Status: CANCELLED | OUTPATIENT
Start: 2021-01-01

## 2021-01-01 RX ORDER — OXYCODONE HYDROCHLORIDE 5 MG/1
10 TABLET ORAL EVERY 4 HOURS PRN
Status: DISCONTINUED | OUTPATIENT
Start: 2021-01-01 | End: 2021-01-01

## 2021-01-01 RX ORDER — FENTANYL CITRATE 50 UG/ML
INJECTION, SOLUTION INTRAMUSCULAR; INTRAVENOUS
Status: DISCONTINUED | OUTPATIENT
Start: 2021-01-01 | End: 2021-01-01

## 2021-01-01 RX ORDER — NITROGLYCERIN 5 MG/ML
INJECTION, SOLUTION INTRAVENOUS
Status: DISCONTINUED | OUTPATIENT
Start: 2021-01-01 | End: 2021-01-01 | Stop reason: HOSPADM

## 2021-01-01 RX ORDER — DILTIAZEM HYDROCHLORIDE 30 MG/1
30 TABLET, FILM COATED ORAL EVERY 6 HOURS
Status: DISCONTINUED | OUTPATIENT
Start: 2021-01-01 | End: 2021-01-01 | Stop reason: HOSPADM

## 2021-01-01 RX ORDER — VERAPAMIL HYDROCHLORIDE 2.5 MG/ML
INJECTION, SOLUTION INTRAVENOUS
Status: DISCONTINUED | OUTPATIENT
Start: 2021-01-01 | End: 2021-01-01 | Stop reason: HOSPADM

## 2021-01-01 RX ORDER — CHLORTHALIDONE 25 MG/1
25 TABLET ORAL DAILY
Status: DISCONTINUED | OUTPATIENT
Start: 2021-01-01 | End: 2021-01-01 | Stop reason: HOSPADM

## 2021-01-01 RX ORDER — DOCUSATE SODIUM 100 MG/1
100 CAPSULE, LIQUID FILLED ORAL 2 TIMES DAILY
Status: DISCONTINUED | OUTPATIENT
Start: 2021-01-01 | End: 2021-01-01

## 2021-01-01 RX ORDER — NAPROXEN SODIUM 220 MG/1
81 TABLET, FILM COATED ORAL ONCE
Status: COMPLETED | OUTPATIENT
Start: 2021-01-01 | End: 2021-01-01

## 2021-01-01 RX ORDER — HYDROCODONE BITARTRATE AND ACETAMINOPHEN 500; 5 MG/1; MG/1
TABLET ORAL
Status: DISCONTINUED | OUTPATIENT
Start: 2021-01-01 | End: 2021-01-01 | Stop reason: HOSPADM

## 2021-01-01 RX ORDER — METOPROLOL TARTRATE 50 MG/1
50 TABLET ORAL 2 TIMES DAILY
Qty: 60 TABLET | Refills: 11 | Status: SHIPPED | OUTPATIENT
Start: 2021-01-01 | End: 2022-01-05

## 2021-01-01 RX ORDER — METOPROLOL TARTRATE 25 MG/1
25 TABLET, FILM COATED ORAL
Status: COMPLETED | OUTPATIENT
Start: 2021-01-01 | End: 2021-01-01

## 2021-01-01 RX ORDER — FOLIC ACID 1 MG/1
1 TABLET ORAL DAILY
Status: DISCONTINUED | OUTPATIENT
Start: 2021-01-01 | End: 2021-01-01 | Stop reason: HOSPADM

## 2021-01-01 RX ORDER — INSULIN ASPART 100 [IU]/ML
1-10 INJECTION, SOLUTION INTRAVENOUS; SUBCUTANEOUS
Status: DISCONTINUED | OUTPATIENT
Start: 2021-01-01 | End: 2021-01-01 | Stop reason: HOSPADM

## 2021-01-01 RX ORDER — LIDOCAINE HYDROCHLORIDE 20 MG/ML
INJECTION, SOLUTION EPIDURAL; INFILTRATION; INTRACAUDAL; PERINEURAL
Status: DISCONTINUED | OUTPATIENT
Start: 2021-01-01 | End: 2021-01-01 | Stop reason: HOSPADM

## 2021-01-01 RX ORDER — ONDANSETRON 2 MG/ML
4 INJECTION INTRAMUSCULAR; INTRAVENOUS EVERY 12 HOURS PRN
Status: DISCONTINUED | OUTPATIENT
Start: 2021-01-01 | End: 2021-01-01 | Stop reason: HOSPADM

## 2021-01-01 RX ORDER — ATORVASTATIN CALCIUM 40 MG/1
40 TABLET, FILM COATED ORAL NIGHTLY
Status: DISCONTINUED | OUTPATIENT
Start: 2021-01-01 | End: 2021-01-01 | Stop reason: HOSPADM

## 2021-01-01 RX ORDER — SODIUM CHLORIDE 9 MG/ML
INJECTION, SOLUTION INTRAVENOUS CONTINUOUS
Status: DISCONTINUED | OUTPATIENT
Start: 2021-01-01 | End: 2021-01-01 | Stop reason: HOSPADM

## 2021-01-01 RX ORDER — METOPROLOL TARTRATE 1 MG/ML
INJECTION, SOLUTION INTRAVENOUS
Status: DISCONTINUED | OUTPATIENT
Start: 2021-01-01 | End: 2021-01-01 | Stop reason: HOSPADM

## 2021-01-01 RX ORDER — CEFAZOLIN SODIUM 2 G/50ML
2 SOLUTION INTRAVENOUS
Status: DISCONTINUED | OUTPATIENT
Start: 2021-01-01 | End: 2021-01-01 | Stop reason: HOSPADM

## 2021-01-01 RX ORDER — PHENYLEPHRINE HYDROCHLORIDE 10 MG/ML
INJECTION INTRAVENOUS
Status: DISCONTINUED | OUTPATIENT
Start: 2021-01-01 | End: 2021-01-01

## 2021-01-01 RX ORDER — METOPROLOL TARTRATE 50 MG/1
100 TABLET ORAL 2 TIMES DAILY
Status: DISCONTINUED | OUTPATIENT
Start: 2021-01-01 | End: 2021-01-01

## 2021-01-01 RX ORDER — PROTAMINE SULFATE 10 MG/ML
INJECTION, SOLUTION INTRAVENOUS
Status: DISCONTINUED | OUTPATIENT
Start: 2021-01-01 | End: 2021-01-01

## 2021-01-01 RX ORDER — FUROSEMIDE 10 MG/ML
40 INJECTION INTRAMUSCULAR; INTRAVENOUS 2 TIMES DAILY
Status: DISCONTINUED | OUTPATIENT
Start: 2021-01-01 | End: 2021-01-01

## 2021-01-01 RX ORDER — AMOXICILLIN 500 MG
1 CAPSULE ORAL 2 TIMES DAILY
COMMUNITY

## 2021-01-01 RX ORDER — MIDAZOLAM HYDROCHLORIDE 1 MG/ML
INJECTION, SOLUTION INTRAMUSCULAR; INTRAVENOUS
Status: DISCONTINUED | OUTPATIENT
Start: 2021-01-01 | End: 2021-01-01 | Stop reason: HOSPADM

## 2021-01-01 RX ORDER — METOPROLOL TARTRATE 50 MG/1
100 TABLET ORAL 2 TIMES DAILY
Status: DISCONTINUED | OUTPATIENT
Start: 2021-01-01 | End: 2021-01-01 | Stop reason: HOSPADM

## 2021-01-01 RX ORDER — ETOMIDATE 2 MG/ML
INJECTION INTRAVENOUS
Status: DISCONTINUED | OUTPATIENT
Start: 2021-01-01 | End: 2021-01-01

## 2021-01-01 RX ORDER — CLOPIDOGREL BISULFATE 75 MG/1
75 TABLET ORAL DAILY
Status: DISCONTINUED | OUTPATIENT
Start: 2021-01-01 | End: 2021-01-01 | Stop reason: HOSPADM

## 2021-01-01 RX ORDER — POLYETHYLENE GLYCOL 3350 17 G/17G
17 POWDER, FOR SOLUTION ORAL DAILY
Status: DISCONTINUED | OUTPATIENT
Start: 2021-01-01 | End: 2021-01-01 | Stop reason: SDUPTHER

## 2021-01-01 RX ORDER — DIAZEPAM 5 MG/1
5 TABLET ORAL
Status: DISCONTINUED | OUTPATIENT
Start: 2021-01-01 | End: 2021-01-01 | Stop reason: HOSPADM

## 2021-01-01 RX ORDER — METOPROLOL TARTRATE 50 MG/1
50 TABLET ORAL ONCE
Status: COMPLETED | OUTPATIENT
Start: 2021-01-01 | End: 2021-01-01

## 2021-01-01 RX ORDER — HEPARIN SODIUM 1000 [USP'U]/ML
INJECTION INTRAVENOUS; SUBCUTANEOUS
Status: DISCONTINUED | OUTPATIENT
Start: 2021-01-01 | End: 2021-01-01 | Stop reason: HOSPADM

## 2021-01-01 RX ORDER — CEFAZOLIN SODIUM 2 G/50ML
2 SOLUTION INTRAVENOUS
Status: COMPLETED | OUTPATIENT
Start: 2021-01-01 | End: 2021-01-01

## 2021-01-01 RX ORDER — POTASSIUM CHLORIDE 14.9 MG/ML
20 INJECTION INTRAVENOUS
Status: DISCONTINUED | OUTPATIENT
Start: 2021-01-01 | End: 2021-01-01 | Stop reason: HOSPADM

## 2021-01-01 RX ORDER — METOPROLOL TARTRATE 25 MG/1
25 TABLET, FILM COATED ORAL ONCE
Status: COMPLETED | OUTPATIENT
Start: 2021-01-01 | End: 2021-01-01

## 2021-01-01 RX ORDER — CHLORHEXIDINE GLUCONATE ORAL RINSE 1.2 MG/ML
10 SOLUTION DENTAL 2 TIMES DAILY
Status: DISCONTINUED | OUTPATIENT
Start: 2021-01-01 | End: 2021-01-01

## 2021-01-01 RX ORDER — ALBUMIN HUMAN 50 G/1000ML
12.5 SOLUTION INTRAVENOUS
Status: DISCONTINUED | OUTPATIENT
Start: 2021-01-01 | End: 2021-01-01 | Stop reason: HOSPADM

## 2021-01-01 RX ORDER — DILTIAZEM HYDROCHLORIDE 30 MG/1
30 TABLET, FILM COATED ORAL EVERY 6 HOURS
Status: DISCONTINUED | OUTPATIENT
Start: 2021-01-01 | End: 2021-01-01

## 2021-01-01 RX ORDER — AMINOCAPROIC ACID 250 MG/ML
INJECTION, SOLUTION INTRAVENOUS
Status: DISCONTINUED | OUTPATIENT
Start: 2021-01-01 | End: 2021-01-01

## 2021-01-01 RX ORDER — NEOMYCIN SULFATE, POLYMYXIN B SULFATE, HYDROCORTISONE 3.5; 10000; 1 MG/ML; [USP'U]/ML; MG/ML
4 SOLUTION/ DROPS AURICULAR (OTIC)
Status: COMPLETED | OUTPATIENT
Start: 2021-01-01 | End: 2021-01-01

## 2021-01-01 RX ORDER — ADENOSINE 3 MG/ML
INJECTION INTRAVENOUS
Status: DISCONTINUED | OUTPATIENT
Start: 2021-01-01 | End: 2021-01-01 | Stop reason: HOSPADM

## 2021-01-01 RX ORDER — CHLORHEXIDINE GLUCONATE ORAL RINSE 1.2 MG/ML
10 SOLUTION DENTAL 2 TIMES DAILY
Status: DISCONTINUED | OUTPATIENT
Start: 2021-01-01 | End: 2021-01-01 | Stop reason: HOSPADM

## 2021-01-01 RX ORDER — MAGNESIUM SULFATE HEPTAHYDRATE 40 MG/ML
4 INJECTION, SOLUTION INTRAVENOUS
Status: DISCONTINUED | OUTPATIENT
Start: 2021-01-01 | End: 2021-01-01 | Stop reason: HOSPADM

## 2021-01-01 RX ORDER — DEXTROSE, SODIUM CHLORIDE, SODIUM LACTATE, POTASSIUM CHLORIDE, AND CALCIUM CHLORIDE 5; .6; .31; .03; .02 G/100ML; G/100ML; G/100ML; G/100ML; G/100ML
INJECTION, SOLUTION INTRAVENOUS CONTINUOUS
Status: ACTIVE | OUTPATIENT
Start: 2021-01-01 | End: 2021-01-01

## 2021-01-01 RX ORDER — DIPHENHYDRAMINE HCL 50 MG
50 CAPSULE ORAL ONCE
Status: COMPLETED | OUTPATIENT
Start: 2021-01-01 | End: 2021-01-01

## 2021-01-01 RX ORDER — POTASSIUM CHLORIDE 14.9 MG/ML
60 INJECTION INTRAVENOUS
Status: DISCONTINUED | OUTPATIENT
Start: 2021-01-01 | End: 2021-01-01 | Stop reason: HOSPADM

## 2021-01-01 RX ORDER — NEOMYCIN SULFATE, POLYMYXIN B SULFATE AND HYDROCORTISONE 10; 3.5; 1 MG/ML; MG/ML; [USP'U]/ML
4 SUSPENSION/ DROPS AURICULAR (OTIC) 4 TIMES DAILY
Qty: 10 ML | Refills: 0 | Status: SHIPPED | OUTPATIENT
Start: 2021-01-01 | End: 2021-01-01

## 2021-01-01 RX ORDER — NICARDIPINE HYDROCHLORIDE 0.2 MG/ML
0-15 INJECTION INTRAVENOUS CONTINUOUS
Status: DISCONTINUED | OUTPATIENT
Start: 2021-01-01 | End: 2021-01-01

## 2021-01-01 RX ORDER — POTASSIUM CHLORIDE 750 MG/1
20 TABLET, EXTENDED RELEASE ORAL EVERY 12 HOURS
Status: DISCONTINUED | OUTPATIENT
Start: 2021-01-01 | End: 2021-01-01

## 2021-01-01 RX ORDER — SODIUM BICARBONATE 1 MEQ/ML
SYRINGE (ML) INTRAVENOUS CODE/TRAUMA/SEDATION MEDICATION
Status: COMPLETED | OUTPATIENT
Start: 2021-01-01 | End: 2021-01-01

## 2021-01-01 RX ORDER — SUCCINYLCHOLINE CHLORIDE 20 MG/ML
INJECTION INTRAMUSCULAR; INTRAVENOUS
Status: DISCONTINUED | OUTPATIENT
Start: 2021-01-01 | End: 2021-01-01

## 2021-01-01 RX ORDER — DEXMEDETOMIDINE HYDROCHLORIDE 4 UG/ML
0-1.4 INJECTION INTRAVENOUS CONTINUOUS
Status: DISCONTINUED | OUTPATIENT
Start: 2021-01-01 | End: 2021-01-01

## 2021-01-01 RX ORDER — TRAMADOL HYDROCHLORIDE 50 MG/1
50 TABLET ORAL EVERY 6 HOURS PRN
Status: DISCONTINUED | OUTPATIENT
Start: 2021-01-01 | End: 2021-01-01 | Stop reason: HOSPADM

## 2021-01-01 RX ORDER — IPRATROPIUM BROMIDE AND ALBUTEROL SULFATE 2.5; .5 MG/3ML; MG/3ML
3 SOLUTION RESPIRATORY (INHALATION) EVERY 4 HOURS
Status: COMPLETED | OUTPATIENT
Start: 2021-01-01 | End: 2021-01-01

## 2021-01-01 RX ORDER — MILRINONE LACTATE 0.2 MG/ML
0.25 INJECTION, SOLUTION INTRAVENOUS CONTINUOUS
Status: DISCONTINUED | OUTPATIENT
Start: 2021-01-01 | End: 2021-01-01

## 2021-01-01 RX ORDER — CHLORHEXIDINE GLUCONATE ORAL RINSE 1.2 MG/ML
10 SOLUTION DENTAL
Status: DISCONTINUED | OUTPATIENT
Start: 2021-01-01 | End: 2021-01-01 | Stop reason: HOSPADM

## 2021-01-01 RX ORDER — IBUPROFEN 200 MG
16 TABLET ORAL
Status: DISCONTINUED | OUTPATIENT
Start: 2021-01-01 | End: 2021-01-01 | Stop reason: HOSPADM

## 2021-01-01 RX ORDER — MIDAZOLAM HYDROCHLORIDE 1 MG/ML
INJECTION, SOLUTION INTRAMUSCULAR; INTRAVENOUS
Status: DISCONTINUED | OUTPATIENT
Start: 2021-01-01 | End: 2021-01-01

## 2021-01-01 RX ORDER — FUROSEMIDE 40 MG/1
40 TABLET ORAL 2 TIMES DAILY
Status: DISCONTINUED | OUTPATIENT
Start: 2021-01-01 | End: 2021-01-01 | Stop reason: HOSPADM

## 2021-01-01 RX ORDER — METOPROLOL TARTRATE 50 MG/1
50 TABLET ORAL 2 TIMES DAILY
Status: DISCONTINUED | OUTPATIENT
Start: 2021-01-01 | End: 2021-01-01

## 2021-01-01 RX ORDER — ACETAMINOPHEN 325 MG/1
650 TABLET ORAL EVERY 4 HOURS PRN
Status: DISCONTINUED | OUTPATIENT
Start: 2021-01-01 | End: 2021-01-01 | Stop reason: HOSPADM

## 2021-01-01 RX ORDER — MAGNESIUM SULFATE HEPTAHYDRATE 40 MG/ML
2 INJECTION, SOLUTION INTRAVENOUS ONCE
Status: DISCONTINUED | OUTPATIENT
Start: 2021-01-01 | End: 2021-01-01 | Stop reason: HOSPADM

## 2021-01-01 RX ORDER — METOPROLOL TARTRATE 25 MG/1
25 TABLET, FILM COATED ORAL 2 TIMES DAILY
Status: DISCONTINUED | OUTPATIENT
Start: 2021-01-01 | End: 2021-01-01

## 2021-01-01 RX ORDER — METOCLOPRAMIDE HYDROCHLORIDE 5 MG/ML
5 INJECTION INTRAMUSCULAR; INTRAVENOUS EVERY 6 HOURS PRN
Status: DISCONTINUED | OUTPATIENT
Start: 2021-01-01 | End: 2021-01-01 | Stop reason: HOSPADM

## 2021-01-01 RX ADMIN — Medication 81 MG: at 09:02

## 2021-01-01 RX ADMIN — POTASSIUM CHLORIDE 20 MEQ: 1500 TABLET, EXTENDED RELEASE ORAL at 08:02

## 2021-01-01 RX ADMIN — SODIUM CHLORIDE 10 UNITS/HR: 9 INJECTION, SOLUTION INTRAVENOUS at 08:02

## 2021-01-01 RX ADMIN — ROCURONIUM BROMIDE 25 MG: 10 INJECTION, SOLUTION INTRAVENOUS at 10:02

## 2021-01-01 RX ADMIN — MAGNESIUM SULFATE 1 G: 1 INJECTION INTRAVENOUS at 06:03

## 2021-01-01 RX ADMIN — INSULIN ASPART 3 UNITS: 100 INJECTION, SOLUTION INTRAVENOUS; SUBCUTANEOUS at 08:02

## 2021-01-01 RX ADMIN — POTASSIUM CHLORIDE 40 MEQ: 14.9 INJECTION, SOLUTION INTRAVENOUS at 10:02

## 2021-01-01 RX ADMIN — DOCUSATE SODIUM 100 MG: 100 CAPSULE ORAL at 08:02

## 2021-01-01 RX ADMIN — INSULIN DETEMIR 25 UNITS: 100 INJECTION, SOLUTION SUBCUTANEOUS at 08:02

## 2021-01-01 RX ADMIN — HYDRALAZINE HYDROCHLORIDE 50 MG: 50 TABLET, FILM COATED ORAL at 11:02

## 2021-01-01 RX ADMIN — POTASSIUM CHLORIDE 40 MEQ: 1500 TABLET, EXTENDED RELEASE ORAL at 09:02

## 2021-01-01 RX ADMIN — CALCIUM GLUCONATE 1000 MG: 98 INJECTION, SOLUTION INTRAVENOUS at 05:02

## 2021-01-01 RX ADMIN — CHLORHEXIDINE GLUCONATE 0.12% ORAL RINSE 10 ML: 1.2 LIQUID ORAL at 08:02

## 2021-01-01 RX ADMIN — CHLORTHALIDONE 25 MG: 25 TABLET ORAL at 09:03

## 2021-01-01 RX ADMIN — EPINEPHRINE 1 MG: 0.1 INJECTION, SOLUTION ENDOTRACHEAL; INTRACARDIAC; INTRAVENOUS at 01:03

## 2021-01-01 RX ADMIN — FERROUS SULFATE TAB EC 325 MG (65 MG FE EQUIVALENT) 325 MG: 325 (65 FE) TABLET DELAYED RESPONSE at 08:02

## 2021-01-01 RX ADMIN — FENTANYL CITRATE 100 MCG: 50 INJECTION, SOLUTION INTRAMUSCULAR; INTRAVENOUS at 07:02

## 2021-01-01 RX ADMIN — INSULIN ASPART 6 UNITS: 100 INJECTION, SOLUTION INTRAVENOUS; SUBCUTANEOUS at 06:02

## 2021-01-01 RX ADMIN — FENTANYL CITRATE 50 MCG: 50 INJECTION INTRAMUSCULAR; INTRAVENOUS at 11:02

## 2021-01-01 RX ADMIN — OXYCODONE HYDROCHLORIDE 10 MG: 5 TABLET ORAL at 04:02

## 2021-01-01 RX ADMIN — DEXMEDETOMIDINE HYDROCHLORIDE 0.3 MCG/KG/HR: 4 INJECTION INTRAVENOUS at 04:02

## 2021-01-01 RX ADMIN — MIDAZOLAM HYDROCHLORIDE 2 MG: 1 INJECTION, SOLUTION INTRAMUSCULAR; INTRAVENOUS at 03:02

## 2021-01-01 RX ADMIN — HYDRALAZINE HYDROCHLORIDE 25 MG: 25 TABLET, FILM COATED ORAL at 04:02

## 2021-01-01 RX ADMIN — MAGNESIUM SULFATE HEPTAHYDRATE 2 G: 2 INJECTION, SOLUTION INTRAVENOUS at 06:02

## 2021-01-01 RX ADMIN — METOPROLOL TARTRATE 100 MG: 50 TABLET, FILM COATED ORAL at 09:02

## 2021-01-01 RX ADMIN — FUROSEMIDE 40 MG: 10 INJECTION, SOLUTION INTRAMUSCULAR; INTRAVENOUS at 09:02

## 2021-01-01 RX ADMIN — ETOMIDATE 30 MG: 2 INJECTION INTRAVENOUS at 07:02

## 2021-01-01 RX ADMIN — METOPROLOL TARTRATE 100 MG: 50 TABLET, FILM COATED ORAL at 08:02

## 2021-01-01 RX ADMIN — AMINOCAPROIC ACID 5 G: 250 INJECTION, SOLUTION INTRAVENOUS at 02:02

## 2021-01-01 RX ADMIN — MAGNESIUM CITRATE 296 ML: 1.75 LIQUID ORAL at 12:02

## 2021-01-01 RX ADMIN — ROCURONIUM BROMIDE 30 MG: 10 INJECTION, SOLUTION INTRAVENOUS at 02:02

## 2021-01-01 RX ADMIN — CLOPIDOGREL 75 MG: 75 TABLET, FILM COATED ORAL at 09:02

## 2021-01-01 RX ADMIN — OXYCODONE HYDROCHLORIDE 10 MG: 5 TABLET ORAL at 08:02

## 2021-01-01 RX ADMIN — INSULIN ASPART 4 UNITS: 100 INJECTION, SOLUTION INTRAVENOUS; SUBCUTANEOUS at 05:03

## 2021-01-01 RX ADMIN — CALCIUM CHLORIDE 1 G: 100 INJECTION INTRAVENOUS; INTRAVENTRICULAR at 01:03

## 2021-01-01 RX ADMIN — INSULIN ASPART 8 UNITS: 100 INJECTION, SOLUTION INTRAVENOUS; SUBCUTANEOUS at 10:02

## 2021-01-01 RX ADMIN — METOCLOPRAMIDE 5 MG: 5 INJECTION, SOLUTION INTRAMUSCULAR; INTRAVENOUS at 05:02

## 2021-01-01 RX ADMIN — VASOPRESSIN 40 UNITS: 20 INJECTION INTRAVENOUS at 01:03

## 2021-01-01 RX ADMIN — INSULIN ASPART 3 UNITS: 100 INJECTION, SOLUTION INTRAVENOUS; SUBCUTANEOUS at 09:02

## 2021-01-01 RX ADMIN — DOCUSATE SODIUM 100 MG: 100 CAPSULE ORAL at 09:03

## 2021-01-01 RX ADMIN — POTASSIUM CHLORIDE 20 MEQ: 1500 TABLET, EXTENDED RELEASE ORAL at 09:03

## 2021-01-01 RX ADMIN — MAGNESIUM SULFATE 2 G: 2 INJECTION INTRAVENOUS at 08:02

## 2021-01-01 RX ADMIN — DIPHENHYDRAMINE HYDROCHLORIDE 50 MG: 50 CAPSULE ORAL at 07:01

## 2021-01-01 RX ADMIN — LOSARTAN POTASSIUM 50 MG: 50 TABLET, FILM COATED ORAL at 09:03

## 2021-01-01 RX ADMIN — HEPARIN SODIUM 10000 UNITS: 1000 INJECTION, SOLUTION INTRAVENOUS; SUBCUTANEOUS at 10:02

## 2021-01-01 RX ADMIN — IPRATROPIUM BROMIDE AND ALBUTEROL SULFATE 3 ML: .5; 3 SOLUTION RESPIRATORY (INHALATION) at 04:02

## 2021-01-01 RX ADMIN — ASPIRIN 81 MG: 81 TABLET, CHEWABLE ORAL at 07:01

## 2021-01-01 RX ADMIN — ROCURONIUM BROMIDE 5 MG: 10 INJECTION, SOLUTION INTRAVENOUS at 07:02

## 2021-01-01 RX ADMIN — CHLORHEXIDINE GLUCONATE 0.12% ORAL RINSE 10 ML: 1.2 LIQUID ORAL at 09:03

## 2021-01-01 RX ADMIN — EPINEPHRINE 0.08 MCG/KG/MIN: 1 INJECTION PARENTERAL at 01:02

## 2021-01-01 RX ADMIN — FUROSEMIDE 40 MG: 40 TABLET ORAL at 10:03

## 2021-01-01 RX ADMIN — ONDANSETRON 4 MG: 2 INJECTION INTRAMUSCULAR; INTRAVENOUS at 07:02

## 2021-01-01 RX ADMIN — IPRATROPIUM BROMIDE AND ALBUTEROL SULFATE 3 ML: .5; 3 SOLUTION RESPIRATORY (INHALATION) at 12:02

## 2021-01-01 RX ADMIN — LIDOCAINE HYDROCHLORIDE 100 MG: 20 INJECTION, SOLUTION INTRAVENOUS at 07:02

## 2021-01-01 RX ADMIN — ALBUMIN (HUMAN): 12.5 INJECTION, SOLUTION INTRAVENOUS at 02:02

## 2021-01-01 RX ADMIN — CEFAZOLIN SODIUM 2 G: 2 SOLUTION INTRAVENOUS at 04:02

## 2021-01-01 RX ADMIN — PANTOPRAZOLE SODIUM 40 MG: 40 INJECTION, POWDER, FOR SOLUTION INTRAVENOUS at 04:02

## 2021-01-01 RX ADMIN — POTASSIUM CHLORIDE 20 MEQ: 1500 TABLET, EXTENDED RELEASE ORAL at 10:02

## 2021-01-01 RX ADMIN — CHLORHEXIDINE GLUCONATE 0.12% ORAL RINSE 10 ML: 1.2 LIQUID ORAL at 09:02

## 2021-01-01 RX ADMIN — DILTIAZEM HYDROCHLORIDE 30 MG: 30 TABLET, FILM COATED ORAL at 07:02

## 2021-01-01 RX ADMIN — POTASSIUM CHLORIDE 20 MEQ: 750 TABLET, EXTENDED RELEASE ORAL at 08:02

## 2021-01-01 RX ADMIN — CEFAZOLIN SODIUM 2 G: 2 SOLUTION INTRAVENOUS at 06:02

## 2021-01-01 RX ADMIN — FUROSEMIDE 40 MG: 10 INJECTION, SOLUTION INTRAMUSCULAR; INTRAVENOUS at 08:02

## 2021-01-01 RX ADMIN — PANTOPRAZOLE SODIUM 40 MG: 40 INJECTION, POWDER, FOR SOLUTION INTRAVENOUS at 08:02

## 2021-01-01 RX ADMIN — ROCURONIUM BROMIDE 20 MG: 10 INJECTION, SOLUTION INTRAVENOUS at 01:02

## 2021-01-01 RX ADMIN — ROCURONIUM BROMIDE 25 MG: 10 INJECTION, SOLUTION INTRAVENOUS at 09:02

## 2021-01-01 RX ADMIN — ATORVASTATIN CALCIUM 40 MG: 40 TABLET, FILM COATED ORAL at 09:02

## 2021-01-01 RX ADMIN — DIAZEPAM 5 MG: 5 TABLET ORAL at 07:01

## 2021-01-01 RX ADMIN — METOPROLOL TARTRATE 100 MG: 50 TABLET, FILM COATED ORAL at 07:02

## 2021-01-01 RX ADMIN — VASOPRESSIN 0.01 UNITS/MIN: 20 INJECTION INTRAVENOUS at 05:02

## 2021-01-01 RX ADMIN — Medication 81 MG: at 08:02

## 2021-01-01 RX ADMIN — SODIUM CHLORIDE 100 ML/HR: 0.9 INJECTION, SOLUTION INTRAVENOUS at 07:01

## 2021-01-01 RX ADMIN — DILTIAZEM HYDROCHLORIDE 30 MG: 30 TABLET, FILM COATED ORAL at 01:02

## 2021-01-01 RX ADMIN — TRAMADOL HYDROCHLORIDE 50 MG: 50 TABLET, FILM COATED ORAL at 05:02

## 2021-01-01 RX ADMIN — INSULIN ASPART 8 UNITS: 100 INJECTION, SOLUTION INTRAVENOUS; SUBCUTANEOUS at 05:02

## 2021-01-01 RX ADMIN — FENTANYL CITRATE 50 MCG: 50 INJECTION, SOLUTION INTRAMUSCULAR; INTRAVENOUS at 01:02

## 2021-01-01 RX ADMIN — FERROUS SULFATE TAB EC 325 MG (65 MG FE EQUIVALENT) 325 MG: 325 (65 FE) TABLET DELAYED RESPONSE at 09:02

## 2021-01-01 RX ADMIN — HYDRALAZINE HYDROCHLORIDE 50 MG: 50 TABLET, FILM COATED ORAL at 05:02

## 2021-01-01 RX ADMIN — POTASSIUM CHLORIDE 40 MEQ: 1500 TABLET, EXTENDED RELEASE ORAL at 12:02

## 2021-01-01 RX ADMIN — INSULIN DETEMIR 25 UNITS: 100 INJECTION, SOLUTION SUBCUTANEOUS at 09:02

## 2021-01-01 RX ADMIN — POLYETHYLENE GLYCOL 3350 17 G: 17 POWDER, FOR SOLUTION ORAL at 09:02

## 2021-01-01 RX ADMIN — CALCIUM CHLORIDE 1 G: 100 INJECTION, SOLUTION INTRAVENOUS at 01:02

## 2021-01-01 RX ADMIN — POTASSIUM CHLORIDE 20 MEQ: 200 INJECTION, SOLUTION INTRAVENOUS at 06:02

## 2021-01-01 RX ADMIN — AMINOCAPROIC ACID 5 G: 250 INJECTION, SOLUTION INTRAVENOUS at 08:02

## 2021-01-01 RX ADMIN — EPINEPHRINE 0.02 MCG/KG/MIN: 1 INJECTION, SOLUTION INTRAMUSCULAR; SUBCUTANEOUS at 03:02

## 2021-01-01 RX ADMIN — SODIUM CHLORIDE 1 UNITS/HR: 9 INJECTION, SOLUTION INTRAVENOUS at 05:02

## 2021-01-01 RX ADMIN — CYANOCOBALAMIN TAB 1000 MCG 1000 MCG: 1000 TAB at 09:02

## 2021-01-01 RX ADMIN — Medication 81 MG: at 09:03

## 2021-01-01 RX ADMIN — DILTIAZEM HYDROCHLORIDE 30 MG: 30 TABLET, FILM COATED ORAL at 11:02

## 2021-01-01 RX ADMIN — ROCURONIUM BROMIDE 50 MG: 10 INJECTION, SOLUTION INTRAVENOUS at 08:02

## 2021-01-01 RX ADMIN — CLOPIDOGREL 75 MG: 75 TABLET, FILM COATED ORAL at 08:02

## 2021-01-01 RX ADMIN — INSULIN ASPART 4 UNITS: 100 INJECTION, SOLUTION INTRAVENOUS; SUBCUTANEOUS at 11:02

## 2021-01-01 RX ADMIN — SUCCINYLCHOLINE CHLORIDE 200 MG: 20 INJECTION, SOLUTION INTRAMUSCULAR; INTRAVENOUS; PARENTERAL at 01:03

## 2021-01-01 RX ADMIN — FOLIC ACID 1 MG: 1 TABLET ORAL at 08:02

## 2021-01-01 RX ADMIN — ONDANSETRON 4 MG: 2 INJECTION INTRAMUSCULAR; INTRAVENOUS at 05:02

## 2021-01-01 RX ADMIN — FOLIC ACID 1 MG: 1 TABLET ORAL at 09:03

## 2021-01-01 RX ADMIN — VANCOMYCIN HYDROCHLORIDE 2500 MG: 1.5 INJECTION, POWDER, LYOPHILIZED, FOR SOLUTION INTRAVENOUS at 07:02

## 2021-01-01 RX ADMIN — ALBUMIN (HUMAN) 12.5 G: 12.5 SOLUTION INTRAVENOUS at 04:02

## 2021-01-01 RX ADMIN — INSULIN ASPART 10 UNITS: 100 INJECTION, SOLUTION INTRAVENOUS; SUBCUTANEOUS at 04:02

## 2021-01-01 RX ADMIN — MAGNESIUM SULFATE 1 G: 1 INJECTION INTRAVENOUS at 09:02

## 2021-01-01 RX ADMIN — SODIUM BICARBONATE 50 MEQ: 84 INJECTION, SOLUTION INTRAVENOUS at 01:03

## 2021-01-01 RX ADMIN — CHLORHEXIDINE GLUCONATE 0.12% ORAL RINSE 10 ML: 1.2 LIQUID ORAL at 06:02

## 2021-01-01 RX ADMIN — OXYCODONE HYDROCHLORIDE 10 MG: 5 TABLET ORAL at 11:02

## 2021-01-01 RX ADMIN — INSULIN DETEMIR 15 UNITS: 100 INJECTION, SOLUTION SUBCUTANEOUS at 08:02

## 2021-01-01 RX ADMIN — METOPROLOL TARTRATE 25 MG: 25 TABLET, FILM COATED ORAL at 08:02

## 2021-01-01 RX ADMIN — MILRINONE LACTATE 0.38 MCG/KG/MIN: 200 INJECTION, SOLUTION INTRAVENOUS at 03:02

## 2021-01-01 RX ADMIN — SUCCINYLCHOLINE CHLORIDE 100 MG: 20 INJECTION, SOLUTION INTRAMUSCULAR; INTRAVENOUS; PARENTERAL at 01:03

## 2021-01-01 RX ADMIN — CHLORTHALIDONE 25 MG: 25 TABLET ORAL at 12:02

## 2021-01-01 RX ADMIN — FENTANYL CITRATE 50 MCG: 50 INJECTION INTRAMUSCULAR; INTRAVENOUS at 08:02

## 2021-01-01 RX ADMIN — MIDAZOLAM HYDROCHLORIDE 2 MG: 1 INJECTION, SOLUTION INTRAMUSCULAR; INTRAVENOUS at 07:02

## 2021-01-01 RX ADMIN — MAGNESIUM SULFATE 1 G: 1 INJECTION INTRAVENOUS at 10:02

## 2021-01-01 RX ADMIN — FERROUS SULFATE TAB EC 325 MG (65 MG FE EQUIVALENT) 325 MG: 325 (65 FE) TABLET DELAYED RESPONSE at 09:03

## 2021-01-01 RX ADMIN — FENTANYL CITRATE 50 MCG: 50 INJECTION INTRAMUSCULAR; INTRAVENOUS at 04:02

## 2021-01-01 RX ADMIN — DILTIAZEM HYDROCHLORIDE 30 MG: 30 TABLET, FILM COATED ORAL at 08:02

## 2021-01-01 RX ADMIN — MAGNESIUM SULFATE 2 G: 2 INJECTION INTRAVENOUS at 11:02

## 2021-01-01 RX ADMIN — DOCUSATE SODIUM 100 MG: 100 CAPSULE ORAL at 09:02

## 2021-01-01 RX ADMIN — DEXMEDETOMIDINE HYDROCHLORIDE 0.3 MCG/KG/HR: 100 INJECTION, SOLUTION, CONCENTRATE INTRAVENOUS at 02:02

## 2021-01-01 RX ADMIN — MAGNESIUM SULFATE 1 G: 1 INJECTION INTRAVENOUS at 08:02

## 2021-01-01 RX ADMIN — ACETAMINOPHEN 1000 MG: 10 INJECTION, SOLUTION INTRAVENOUS at 07:02

## 2021-01-01 RX ADMIN — METOCLOPRAMIDE 5 MG: 5 INJECTION, SOLUTION INTRAMUSCULAR; INTRAVENOUS at 08:02

## 2021-01-01 RX ADMIN — IPRATROPIUM BROMIDE AND ALBUTEROL SULFATE 3 ML: .5; 3 SOLUTION RESPIRATORY (INHALATION) at 11:02

## 2021-01-01 RX ADMIN — IPRATROPIUM BROMIDE AND ALBUTEROL SULFATE 3 ML: .5; 3 SOLUTION RESPIRATORY (INHALATION) at 07:02

## 2021-01-01 RX ADMIN — DILTIAZEM HYDROCHLORIDE 240 MG: 240 CAPSULE, COATED, EXTENDED RELEASE ORAL at 12:02

## 2021-01-01 RX ADMIN — CLOPIDOGREL 75 MG: 75 TABLET, FILM COATED ORAL at 09:03

## 2021-01-01 RX ADMIN — PANTOPRAZOLE SODIUM 40 MG: 40 INJECTION, POWDER, FOR SOLUTION INTRAVENOUS at 09:02

## 2021-01-01 RX ADMIN — MAGNESIUM SULFATE 2 G: 2 INJECTION INTRAVENOUS at 09:02

## 2021-01-01 RX ADMIN — PANTOPRAZOLE SODIUM 40 MG: 40 INJECTION, POWDER, FOR SOLUTION INTRAVENOUS at 09:03

## 2021-01-01 RX ADMIN — METOPROLOL TARTRATE 50 MG: 50 TABLET, FILM COATED ORAL at 03:02

## 2021-01-01 RX ADMIN — ATORVASTATIN CALCIUM 40 MG: 40 TABLET, FILM COATED ORAL at 08:02

## 2021-01-01 RX ADMIN — METOPROLOL TARTRATE 25 MG: 25 TABLET, FILM COATED ORAL at 10:02

## 2021-01-01 RX ADMIN — CEFAZOLIN 3 G: 1 INJECTION, POWDER, FOR SOLUTION INTRAMUSCULAR; INTRAVENOUS at 07:02

## 2021-01-01 RX ADMIN — MAGNESIUM SULFATE 2 G: 2 INJECTION INTRAVENOUS at 01:02

## 2021-01-01 RX ADMIN — DILTIAZEM HYDROCHLORIDE 30 MG: 30 TABLET, FILM COATED ORAL at 10:03

## 2021-01-01 RX ADMIN — DEXTROSE, SODIUM CHLORIDE, SODIUM LACTATE, POTASSIUM CHLORIDE, AND CALCIUM CHLORIDE: 5; .6; .31; .03; .02 INJECTION, SOLUTION INTRAVENOUS at 04:02

## 2021-01-01 RX ADMIN — INSULIN ASPART 4 UNITS: 100 INJECTION, SOLUTION INTRAVENOUS; SUBCUTANEOUS at 05:02

## 2021-01-01 RX ADMIN — PROTAMINE SULFATE 50 MG: 10 INJECTION, SOLUTION INTRAVENOUS at 02:02

## 2021-01-01 RX ADMIN — DILTIAZEM HYDROCHLORIDE 240 MG: 240 CAPSULE, COATED, EXTENDED RELEASE ORAL at 08:02

## 2021-01-01 RX ADMIN — TRAMADOL HYDROCHLORIDE 50 MG: 50 TABLET, FILM COATED ORAL at 08:02

## 2021-01-01 RX ADMIN — PHENYLEPHRINE HYDROCHLORIDE 100 MCG: 10 INJECTION INTRAVENOUS at 10:02

## 2021-01-01 RX ADMIN — AMIODARONE HYDROCHLORIDE 200 MG: 200 TABLET ORAL at 06:02

## 2021-01-01 RX ADMIN — VANCOMYCIN HYDROCHLORIDE 1500 MG: 1.5 INJECTION, POWDER, LYOPHILIZED, FOR SOLUTION INTRAVENOUS at 09:02

## 2021-01-01 RX ADMIN — METOPROLOL TARTRATE 100 MG: 50 TABLET, FILM COATED ORAL at 09:03

## 2021-01-01 RX ADMIN — POTASSIUM CHLORIDE 20 MEQ: 200 INJECTION, SOLUTION INTRAVENOUS at 09:03

## 2021-01-01 RX ADMIN — INSULIN HUMAN 5 UNITS: 100 INJECTION, SOLUTION PARENTERAL at 09:02

## 2021-01-01 RX ADMIN — PROPOFOL 100 MG: 10 INJECTION, EMULSION INTRAVENOUS at 07:02

## 2021-01-01 RX ADMIN — DILTIAZEM HYDROCHLORIDE 30 MG: 30 TABLET, FILM COATED ORAL at 05:02

## 2021-01-01 RX ADMIN — SUCCINYLCHOLINE CHLORIDE 140 MG: 20 INJECTION, SOLUTION INTRAMUSCULAR; INTRAVENOUS at 07:02

## 2021-01-01 RX ADMIN — CHLORTHALIDONE 25 MG: 25 TABLET ORAL at 08:02

## 2021-01-01 RX ADMIN — FOLIC ACID 1 MG: 1 TABLET ORAL at 09:02

## 2021-01-01 RX ADMIN — LOSARTAN POTASSIUM 50 MG: 50 TABLET, FILM COATED ORAL at 08:02

## 2021-01-01 RX ADMIN — HEPARIN SODIUM 60000 UNITS: 1000 INJECTION, SOLUTION INTRAVENOUS; SUBCUTANEOUS at 09:02

## 2021-01-01 RX ADMIN — IOHEXOL 100 ML: 350 INJECTION, SOLUTION INTRAVENOUS at 01:03

## 2021-01-01 RX ADMIN — LOSARTAN POTASSIUM 50 MG: 50 TABLET, FILM COATED ORAL at 12:02

## 2021-01-01 RX ADMIN — PROTAMINE SULFATE 800 MG: 10 INJECTION, SOLUTION INTRAVENOUS at 02:02

## 2021-01-01 RX ADMIN — INSULIN DETEMIR 15 UNITS: 100 INJECTION, SOLUTION SUBCUTANEOUS at 10:02

## 2021-01-01 RX ADMIN — INSULIN DETEMIR 5 UNITS: 100 INJECTION, SOLUTION SUBCUTANEOUS at 12:02

## 2021-01-01 RX ADMIN — INSULIN ASPART 10 UNITS: 100 INJECTION, SOLUTION INTRAVENOUS; SUBCUTANEOUS at 11:02

## 2021-01-01 RX ADMIN — INSULIN ASPART 8 UNITS: 100 INJECTION, SOLUTION INTRAVENOUS; SUBCUTANEOUS at 07:02

## 2021-01-01 RX ADMIN — SUGAMMADEX 200 MG: 100 INJECTION, SOLUTION INTRAVENOUS at 03:02

## 2021-01-01 RX ADMIN — OXYCODONE HYDROCHLORIDE 10 MG: 5 TABLET ORAL at 05:02

## 2021-01-01 RX ADMIN — CEFAZOLIN SODIUM 2 G: 2 SOLUTION INTRAVENOUS at 11:02

## 2021-01-01 RX ADMIN — PHENYLEPHRINE HYDROCHLORIDE 200 MCG: 10 INJECTION INTRAVENOUS at 10:02

## 2021-01-01 RX ADMIN — CYANOCOBALAMIN TAB 1000 MCG 1000 MCG: 1000 TAB at 09:03

## 2021-01-01 RX ADMIN — FENTANYL CITRATE 50 MCG: 50 INJECTION, SOLUTION INTRAMUSCULAR; INTRAVENOUS at 08:02

## 2021-01-01 RX ADMIN — CYANOCOBALAMIN TAB 1000 MCG 1000 MCG: 1000 TAB at 08:02

## 2021-01-01 RX ADMIN — INSULIN ASPART 4 UNITS: 100 INJECTION, SOLUTION INTRAVENOUS; SUBCUTANEOUS at 08:02

## 2021-01-01 RX ADMIN — POLYETHYLENE GLYCOL 3350 17 G: 17 POWDER, FOR SOLUTION ORAL at 08:02

## 2021-01-01 RX ADMIN — NEOMYCIN SULFATE, POLYMYXIN B SULFATE, HYDROCORTISONE 4 DROP: 3.5; 10000; 1 SOLUTION/ DROPS AURICULAR (OTIC) at 11:02

## 2021-01-01 RX ADMIN — INSULIN DETEMIR 10 UNITS: 100 INJECTION, SOLUTION SUBCUTANEOUS at 12:02

## 2021-01-01 RX ADMIN — FENTANYL CITRATE 50 MCG: 50 INJECTION INTRAMUSCULAR; INTRAVENOUS at 03:02

## 2021-01-01 RX ADMIN — ROCURONIUM BROMIDE 45 MG: 10 INJECTION, SOLUTION INTRAVENOUS at 07:02

## 2021-01-01 RX ADMIN — FENTANYL CITRATE 50 MCG: 50 INJECTION, SOLUTION INTRAMUSCULAR; INTRAVENOUS at 03:02

## 2021-01-01 RX ADMIN — METOPROLOL TARTRATE 25 MG: 25 TABLET, FILM COATED ORAL at 06:02

## 2021-01-05 PROBLEM — I25.10 CORONARY ARTERY DISEASE: Status: ACTIVE | Noted: 2021-01-01

## 2021-02-24 PROBLEM — Z95.1 S/P CABG X 5: Status: ACTIVE | Noted: 2021-01-01

## 2021-02-24 PROBLEM — Z99.11 ON MECHANICALLY ASSISTED VENTILATION: Status: ACTIVE | Noted: 2021-01-01

## 2021-02-24 PROBLEM — G47.33 OSA ON CPAP: Chronic | Status: ACTIVE | Noted: 2020-01-01

## 2021-02-27 PROBLEM — Z99.11 ON MECHANICALLY ASSISTED VENTILATION: Status: RESOLVED | Noted: 2021-01-01 | Resolved: 2021-01-01

## 2021-03-01 PROBLEM — I26.99 ACUTE PULMONARY EMBOLISM: Status: ACTIVE | Noted: 2021-01-01

## 2021-03-19 ENCOUNTER — TELEPHONE (OUTPATIENT)
Dept: CARDIOTHORACIC SURGERY | Facility: CLINIC | Age: 51
End: 2021-03-19

## 2022-01-01 NOTE — PLAN OF CARE
Wangr spoke with pt for initial assessment. Pt lives with niece and was independent with ADLs prior to admission. Pt's help at home and transportation at discharge will be pt's niece. Pt stated he used a CPAP machine, but left it in [place]. Pt inquired about a new CPAP machine. Swer explained to pt that he will need to speak with primary care physician, and they will refer him to a pulmonologist. Pt denied having home health in the past. Pt does not have a problem obtaining medication and niece provide transportation at discharge. Swer stated discharge planning begins on admission and provided pt with contact information for any needs/questions. Swer explained blue folder that will be placed in pt's room.     PCP; [facility]  My Chart; Active  Bedside Delivery; Yes     12/16/20 1042   Discharge Assessment   Assessment Type Discharge Planning Assessment   Assessment information obtained from? Patient   Communicated expected length of stay with patient/caregiver yes   Prior to hospitilization cognitive status: Alert/Oriented   Prior to hospitalization functional status: Independent   Current cognitive status: Alert/Oriented   Current Functional Status: Independent   Facility Arrived From: home   Lives With other relative(s)  (Niece)   Able to Return to Prior Arrangements yes   Is patient able to care for self after discharge? Yes   Who are your caregiver(s) and their phone number(s)? Katarina adhikari (Niece) [phone]   Patient's perception of discharge disposition home or selfcare   Readmission Within the Last 30 Days no previous admission in last 30 days   Patient currently being followed by outpatient case management? No   Patient currently receives any other outside agency services? No   Equipment Currently Used at Home CPAP   Do you have any problems affording any of your prescribed medications? No   Is the patient taking medications as prescribed? yes   Does the patient have transportation home? Yes    Transportation Anticipated family or friend will provide   Discharge Plan A Home with family   DME Needed Upon Discharge  none   Patient/Family in Agreement with Plan yes        show

## (undated) DEVICE — CANNULA SOFT FLOW EXT 24F 8MM

## (undated) DEVICE — EVACUATOR WOUND BULB 100CC

## (undated) DEVICE — SUT PROLENE 4-0 SH BLU 36IN

## (undated) DEVICE — GLOVE BIOGEL 7.5

## (undated) DEVICE — FILTER BACTERIA 001851

## (undated) DEVICE — CABLE PACEMAKER CARD 6FT BLUE

## (undated) DEVICE — SPONGE DERMACEA GAUZE 4X4

## (undated) DEVICE — GAUZE SPONGE 4X4 12PLY

## (undated) DEVICE — CATH JL4 5FR

## (undated) DEVICE — HOOK HARMONIC SYNERGY DISSECT

## (undated) DEVICE — Device

## (undated) DEVICE — KIT PREVENA PLUS

## (undated) DEVICE — GLOVE BIOGEL PI MICRO SZ 6

## (undated) DEVICE — PUNCH AORTIC SHORT 4.4MM

## (undated) DEVICE — DRAPE INCISE IOBAN 2 23X17IN

## (undated) DEVICE — SOL 9P NACL IRR PIC IL

## (undated) DEVICE — APPLIER LIGACLIP SM 9.38IN

## (undated) DEVICE — ANGIOTOUCH KIT

## (undated) DEVICE — KIT INTRODUCER STIFFEN MICRO

## (undated) DEVICE — APPLIER CLIP LIAGCLIP 9.375IN

## (undated) DEVICE — SYS VIRTUOSAPH PLUS EVM

## (undated) DEVICE — ORGNZR TUBING CLR W/CLIPS

## (undated) DEVICE — CATH ALL PUR URTHL RR 18FR

## (undated) DEVICE — COVER SURG LIGHT HANDLE

## (undated) DEVICE — CANNULA VENOUS MC2X 29FR

## (undated) DEVICE — BANDAGE ACE DOUBLE STER 6IN

## (undated) DEVICE — SYR 30CC LUER LOCK

## (undated) DEVICE — DRAIN CHAN RND HUBLS 8MM 24FR

## (undated) DEVICE — ELECTRODE BLADE E-Z CLEAN 4IN

## (undated) DEVICE — SUT PROLENE 4-0 RB-1 BL MO

## (undated) DEVICE — WIRE TEMP PACING 0 SH SKS-3

## (undated) DEVICE — SUT SILK 2-0 SH 18IN BLACK

## (undated) DEVICE — INSERTS STEALTH FIBRA SIZE 5

## (undated) DEVICE — SEE MEDLINE ITEM 152622

## (undated) DEVICE — PACK OPEN HEART BR

## (undated) DEVICE — DRESSING ANTIMICROBIAL 1 INCH

## (undated) DEVICE — TAPE SILK 3IN

## (undated) DEVICE — KIT PROBE COVER WITH GEL

## (undated) DEVICE — SPONGE LAP 18X18 PREWASHED

## (undated) DEVICE — SUT PROLENE 6-0 C-1 30IN BL

## (undated) DEVICE — SET SUCTION ANTICOAGULANT

## (undated) DEVICE — SUT VICRYL 1 OB 36 CTX

## (undated) DEVICE — DRESSING MEPORE 3.6 X 10

## (undated) DEVICE — SEE MEDLINE ITEM 157117

## (undated) DEVICE — CATH INFINITI MULTIPAK JR4 5FR

## (undated) DEVICE — VENTILATOR TRANSPORT CIRCUIT

## (undated) DEVICE — SYR 20ML BLUE LUER LOCK

## (undated) DEVICE — NDL SAFETY 22G X 1.5 ECLIPSE

## (undated) DEVICE — SEE MEDLINE ITEM 152739

## (undated) DEVICE — SOL NS 1000CC

## (undated) DEVICE — CLIP STEALTH LATIS 1/4 FORCE 6

## (undated) DEVICE — SUT MONOCRYL 4-0 PS-1 UND

## (undated) DEVICE — CATH JR4 5FR

## (undated) DEVICE — SHEET DRAPE FAN-FOLDED 3/4

## (undated) DEVICE — BLADE SCALP OPHTL BEVEL STR

## (undated) DEVICE — BLADE SURG CARBON STEEL SZ11

## (undated) DEVICE — DRESSING MEPORE ADH 3.5X12

## (undated) DEVICE — CONTAINER SPECIMEN STRL 4OZ

## (undated) DEVICE — RETRACTOR OCTOBASE INSERT HOLD

## (undated) DEVICE — TRAY FOLEY SURESTEP 16FR

## (undated) DEVICE — CANNULA AG CARDPLG 14G FLANGE

## (undated) DEVICE — TUBING HEATED INSUFFLATOR

## (undated) DEVICE — SOL WATER STRL IRR 1000ML

## (undated) DEVICE — SUT SILK 1 6-30 LABYRINTH

## (undated) DEVICE — CANNULA PERF RCSP 15FR SINUS

## (undated) DEVICE — DRAIN CHEST DRY SUCTION

## (undated) DEVICE — CELL SAVER 4/CASE

## (undated) DEVICE — DRESSING AQUACEL FOAM ADH 8X7

## (undated) DEVICE — CONNECTOR 6 IN 1 Y

## (undated) DEVICE — GOWN SMARTGOWN LVL4 X-LONG XL

## (undated) DEVICE — SUT VICRYL 2-0 36 CT-1

## (undated) DEVICE — SUT 8/0 24IN PROLENE BL MON

## (undated) DEVICE — BLADE KNIFE UNITOME 4.0MM

## (undated) DEVICE — BAND TR WITH INFLATOR

## (undated) DEVICE — CATH PIG145 INFINITI 5X110CM

## (undated) DEVICE — SUT PERMA HAND SILK BLK 0-0

## (undated) DEVICE — ELECTRODE REM PLYHSV RETURN 9

## (undated) DEVICE — SEE MEDLINE ITEM 146347

## (undated) DEVICE — KIT SYR REUSABLE

## (undated) DEVICE — DRAIN CHANNEL ROUND 19FR

## (undated) DEVICE — BLOWER MISTER

## (undated) DEVICE — GOWN SURG 2XL DISP TIE BACK

## (undated) DEVICE — GUIDEWIRE WHOLEY HI TORQ 175CM

## (undated) DEVICE — SUT 7/0 24IN PROLENE BL MO

## (undated) DEVICE — SEE MEDLINE ITEM 157144

## (undated) DEVICE — CANNULA IMA 1MM

## (undated) DEVICE — SEE MEDLINE ITEM 152548

## (undated) DEVICE — DRAPE SLUSH WARMER WITH DISC

## (undated) DEVICE — CANNULA VSL W/DUCKBILL

## (undated) DEVICE — PACK CATH LAB CUSTOM BR

## (undated) DEVICE — DRESSING MEPORE ISLAND 31/2X4

## (undated) DEVICE — COUNTER BDL BLADEGUARD LI DBL

## (undated) DEVICE — KIT MANIFOLD LOW PRESS TUBING

## (undated) DEVICE — SUT PLEDGET LG SOFT

## (undated) DEVICE — CONNECTOR STRAIGHT 1/4X1/4IN

## (undated) DEVICE — CONNECTOR 3/8X3/8 STERILE

## (undated) DEVICE — GUIDEWIRE EMERALD .035IN 260CM

## (undated) DEVICE — SUT SILK 2-0 STRANDS 30IN

## (undated) DEVICE — SYS VAC W/PAD CONN/CLM

## (undated) DEVICE — SOL ISOLYTE S PH 7.4 1000ML

## (undated) DEVICE — SET DECANTER MEDICHOICE

## (undated) DEVICE — KIT SURGIFLO HEMOSTATIC MATRIX

## (undated) DEVICE — KIT GLIDESHEATH SLEND 6FR 10CM

## (undated) DEVICE — OMNIPAQUE 300MG 150ML VIAL

## (undated) DEVICE — SEE MEDLINE ITEM 146417